# Patient Record
Sex: FEMALE | Race: WHITE | NOT HISPANIC OR LATINO | Employment: OTHER | URBAN - METROPOLITAN AREA
[De-identification: names, ages, dates, MRNs, and addresses within clinical notes are randomized per-mention and may not be internally consistent; named-entity substitution may affect disease eponyms.]

---

## 2018-06-26 ENCOUNTER — HOSPITAL ENCOUNTER (OUTPATIENT)
Dept: RADIOLOGY | Facility: HOSPITAL | Age: 43
Discharge: HOME | End: 2018-06-26
Attending: FAMILY MEDICINE
Payer: MEDICAID

## 2018-06-26 DIAGNOSIS — J44.9 COPD (CHRONIC OBSTRUCTIVE PULMONARY DISEASE): Primary | ICD-10-CM

## 2018-06-26 DIAGNOSIS — M54.2 CERVICAL PAIN: ICD-10-CM

## 2018-06-26 DIAGNOSIS — R53.81 MALAISE: ICD-10-CM

## 2018-06-26 DIAGNOSIS — F17.210 HEAVY CIGARETTE SMOKER: ICD-10-CM

## 2018-06-26 DIAGNOSIS — J44.9 COPD (CHRONIC OBSTRUCTIVE PULMONARY DISEASE): ICD-10-CM

## 2018-06-26 DIAGNOSIS — Z13.29 SCREENING FOR THYROID DISORDER: ICD-10-CM

## 2018-06-26 DIAGNOSIS — E78.9 DISORDER OF LIPOPROTEIN AND LIPID METABOLISM: ICD-10-CM

## 2018-06-26 PROCEDURE — 72110 X-RAY EXAM L-2 SPINE 4/>VWS: CPT | Mod: TC,FY

## 2018-06-26 PROCEDURE — 71046 X-RAY EXAM CHEST 2 VIEWS: CPT | Mod: TC,FY

## 2018-06-26 PROCEDURE — 71046 X-RAY EXAM CHEST 2 VIEWS: CPT | Mod: 26,,, | Performed by: RADIOLOGY

## 2018-06-26 PROCEDURE — 72050 X-RAY EXAM NECK SPINE 4/5VWS: CPT | Mod: 26,,, | Performed by: RADIOLOGY

## 2018-06-26 PROCEDURE — 72110 X-RAY EXAM L-2 SPINE 4/>VWS: CPT | Mod: 26,,, | Performed by: RADIOLOGY

## 2018-06-26 PROCEDURE — 72050 X-RAY EXAM NECK SPINE 4/5VWS: CPT | Mod: TC,FY

## 2018-08-06 ENCOUNTER — HOSPITAL ENCOUNTER (OUTPATIENT)
Dept: RADIOLOGY | Facility: HOSPITAL | Age: 43
Discharge: HOME | End: 2018-08-06
Attending: OBSTETRICS & GYNECOLOGY
Payer: MEDICAID

## 2018-08-06 DIAGNOSIS — N92.0 MENORRHAGIA: ICD-10-CM

## 2018-08-06 DIAGNOSIS — Z01.818 PREOPERATIVE TESTING: ICD-10-CM

## 2018-08-06 DIAGNOSIS — N92.0 MENORRHAGIA: Primary | ICD-10-CM

## 2018-08-06 PROCEDURE — 71046 X-RAY EXAM CHEST 2 VIEWS: CPT | Mod: TC,FY

## 2018-08-06 PROCEDURE — 71046 X-RAY EXAM CHEST 2 VIEWS: CPT | Mod: 26,,, | Performed by: RADIOLOGY

## 2018-08-08 ENCOUNTER — SURGERY (OUTPATIENT)
Age: 43
End: 2018-08-08

## 2018-08-08 ENCOUNTER — HOSPITAL ENCOUNTER (OUTPATIENT)
Facility: HOSPITAL | Age: 43
Discharge: HOME | End: 2018-08-08
Attending: OBSTETRICS & GYNECOLOGY | Admitting: OBSTETRICS & GYNECOLOGY
Payer: MEDICAID

## 2018-08-08 ENCOUNTER — ANESTHESIA (OUTPATIENT)
Dept: SURGERY | Facility: HOSPITAL | Age: 43
End: 2018-08-08
Payer: MEDICAID

## 2018-08-08 ENCOUNTER — ANESTHESIA EVENT (OUTPATIENT)
Dept: SURGERY | Facility: HOSPITAL | Age: 43
End: 2018-08-08
Payer: MEDICAID

## 2018-08-08 VITALS
RESPIRATION RATE: 19 BRPM | SYSTOLIC BLOOD PRESSURE: 115 MMHG | TEMPERATURE: 98 F | HEART RATE: 80 BPM | OXYGEN SATURATION: 100 % | BODY MASS INDEX: 38.09 KG/M2 | HEIGHT: 63 IN | DIASTOLIC BLOOD PRESSURE: 64 MMHG | WEIGHT: 215 LBS

## 2018-08-08 DIAGNOSIS — N92.1 MENOMETRORRHAGIA: ICD-10-CM

## 2018-08-08 PROBLEM — N94.6 DYSMENORRHEA: Status: ACTIVE | Noted: 2018-08-08

## 2018-08-08 PROCEDURE — 36000707: Performed by: OBSTETRICS & GYNECOLOGY

## 2018-08-08 PROCEDURE — 37000009 HC ANESTHESIA EA ADD 15 MINS: Performed by: OBSTETRICS & GYNECOLOGY

## 2018-08-08 PROCEDURE — 25000242 PHARM REV CODE 250 ALT 637 W/ HCPCS: Performed by: OBSTETRICS & GYNECOLOGY

## 2018-08-08 PROCEDURE — 25000003 PHARM REV CODE 250: Performed by: OBSTETRICS & GYNECOLOGY

## 2018-08-08 PROCEDURE — 88305 TISSUE EXAM BY PATHOLOGIST: CPT | Mod: 26,,, | Performed by: PATHOLOGY

## 2018-08-08 PROCEDURE — 37000008 HC ANESTHESIA 1ST 15 MINUTES: Performed by: OBSTETRICS & GYNECOLOGY

## 2018-08-08 PROCEDURE — D9220A PRA ANESTHESIA: Mod: ,,, | Performed by: ANESTHESIOLOGY

## 2018-08-08 PROCEDURE — 88305 TISSUE EXAM BY PATHOLOGIST: CPT | Performed by: PATHOLOGY

## 2018-08-08 PROCEDURE — 71000015 HC POSTOP RECOV 1ST HR: Performed by: OBSTETRICS & GYNECOLOGY

## 2018-08-08 PROCEDURE — 63600175 PHARM REV CODE 636 W HCPCS: Performed by: NURSE ANESTHETIST, CERTIFIED REGISTERED

## 2018-08-08 PROCEDURE — 36000706: Performed by: OBSTETRICS & GYNECOLOGY

## 2018-08-08 PROCEDURE — 27201423 OPTIME MED/SURG SUP & DEVICES STERILE SUPPLY: Performed by: OBSTETRICS & GYNECOLOGY

## 2018-08-08 RX ORDER — IPRATROPIUM BROMIDE AND ALBUTEROL SULFATE 2.5; .5 MG/3ML; MG/3ML
3 SOLUTION RESPIRATORY (INHALATION)
Status: COMPLETED | OUTPATIENT
Start: 2018-08-08 | End: 2018-08-08

## 2018-08-08 RX ORDER — SODIUM CHLORIDE, SODIUM LACTATE, POTASSIUM CHLORIDE, CALCIUM CHLORIDE 600; 310; 30; 20 MG/100ML; MG/100ML; MG/100ML; MG/100ML
INJECTION, SOLUTION INTRAVENOUS CONTINUOUS
Status: DISCONTINUED | OUTPATIENT
Start: 2018-08-08 | End: 2018-08-08

## 2018-08-08 RX ORDER — OXYCODONE AND ACETAMINOPHEN 10; 325 MG/1; MG/1
1 TABLET ORAL EVERY 4 HOURS PRN
Status: DISCONTINUED | OUTPATIENT
Start: 2018-08-08 | End: 2018-08-08 | Stop reason: HOSPADM

## 2018-08-08 RX ORDER — ONDANSETRON 2 MG/ML
4 INJECTION INTRAMUSCULAR; INTRAVENOUS DAILY PRN
Status: DISCONTINUED | OUTPATIENT
Start: 2018-08-08 | End: 2018-08-08 | Stop reason: HOSPADM

## 2018-08-08 RX ORDER — IPRATROPIUM BROMIDE 0.5 MG/2.5ML
0.5 SOLUTION RESPIRATORY (INHALATION)
Status: DISCONTINUED | OUTPATIENT
Start: 2018-08-08 | End: 2018-08-08

## 2018-08-08 RX ORDER — ONDANSETRON 4 MG/1
4 TABLET, ORALLY DISINTEGRATING ORAL ONCE
Status: DISCONTINUED | OUTPATIENT
Start: 2018-08-08 | End: 2018-08-08

## 2018-08-08 RX ORDER — IPRATROPIUM BROMIDE 0.5 MG/2.5ML
0.5 SOLUTION RESPIRATORY (INHALATION) EVERY 6 HOURS
Status: DISCONTINUED | OUTPATIENT
Start: 2018-08-08 | End: 2018-08-08

## 2018-08-08 RX ORDER — DEXAMETHASONE SODIUM PHOSPHATE 4 MG/ML
INJECTION, SOLUTION INTRA-ARTICULAR; INTRALESIONAL; INTRAMUSCULAR; INTRAVENOUS; SOFT TISSUE
Status: DISCONTINUED | OUTPATIENT
Start: 2018-08-08 | End: 2018-08-08

## 2018-08-08 RX ORDER — MIDAZOLAM HYDROCHLORIDE 1 MG/ML
2 INJECTION INTRAMUSCULAR; INTRAVENOUS ONCE
Status: DISCONTINUED | OUTPATIENT
Start: 2018-08-08 | End: 2018-08-08 | Stop reason: HOSPADM

## 2018-08-08 RX ORDER — LIDOCAINE HYDROCHLORIDE 10 MG/ML
1 INJECTION, SOLUTION EPIDURAL; INFILTRATION; INTRACAUDAL; PERINEURAL ONCE
Status: DISCONTINUED | OUTPATIENT
Start: 2018-08-08 | End: 2018-08-08 | Stop reason: HOSPADM

## 2018-08-08 RX ORDER — MIDAZOLAM HYDROCHLORIDE 1 MG/ML
INJECTION, SOLUTION INTRAMUSCULAR; INTRAVENOUS
Status: DISCONTINUED | OUTPATIENT
Start: 2018-08-08 | End: 2018-08-08

## 2018-08-08 RX ORDER — OXYCODONE AND ACETAMINOPHEN 10; 325 MG/1; MG/1
1 TABLET ORAL ONCE
Status: COMPLETED | OUTPATIENT
Start: 2018-08-08 | End: 2018-08-08

## 2018-08-08 RX ORDER — DIPHENHYDRAMINE HYDROCHLORIDE 50 MG/ML
12.5 INJECTION INTRAMUSCULAR; INTRAVENOUS
Status: DISCONTINUED | OUTPATIENT
Start: 2018-08-08 | End: 2018-08-08 | Stop reason: HOSPADM

## 2018-08-08 RX ORDER — SODIUM CHLORIDE, SODIUM LACTATE, POTASSIUM CHLORIDE, CALCIUM CHLORIDE 600; 310; 30; 20 MG/100ML; MG/100ML; MG/100ML; MG/100ML
INJECTION, SOLUTION INTRAVENOUS
Status: DISCONTINUED | OUTPATIENT
Start: 2018-08-08 | End: 2018-08-08

## 2018-08-08 RX ORDER — DOXYCYCLINE 100 MG/10ML
100 INJECTION, POWDER, LYOPHILIZED, FOR SOLUTION INTRAVENOUS ONCE
Status: COMPLETED | OUTPATIENT
Start: 2018-08-08 | End: 2018-08-08

## 2018-08-08 RX ORDER — MEPERIDINE HYDROCHLORIDE 50 MG/ML
INJECTION INTRAMUSCULAR; INTRAVENOUS; SUBCUTANEOUS
Status: DISCONTINUED | OUTPATIENT
Start: 2018-08-08 | End: 2018-08-08

## 2018-08-08 RX ORDER — MORPHINE SULFATE 4 MG/ML
2 INJECTION, SOLUTION INTRAMUSCULAR; INTRAVENOUS EVERY 5 MIN PRN
Status: DISCONTINUED | OUTPATIENT
Start: 2018-08-08 | End: 2018-08-08 | Stop reason: HOSPADM

## 2018-08-08 RX ORDER — SODIUM CHLORIDE, SODIUM LACTATE, POTASSIUM CHLORIDE, CALCIUM CHLORIDE 600; 310; 30; 20 MG/100ML; MG/100ML; MG/100ML; MG/100ML
INJECTION, SOLUTION INTRAVENOUS CONTINUOUS
Status: DISCONTINUED | OUTPATIENT
Start: 2018-08-08 | End: 2018-08-08 | Stop reason: HOSPADM

## 2018-08-08 RX ORDER — PROPOFOL 10 MG/ML
VIAL (ML) INTRAVENOUS
Status: DISCONTINUED | OUTPATIENT
Start: 2018-08-08 | End: 2018-08-08

## 2018-08-08 RX ORDER — SODIUM CHLORIDE, SODIUM LACTATE, POTASSIUM CHLORIDE, CALCIUM CHLORIDE 600; 310; 30; 20 MG/100ML; MG/100ML; MG/100ML; MG/100ML
125 INJECTION, SOLUTION INTRAVENOUS CONTINUOUS
Status: DISCONTINUED | OUTPATIENT
Start: 2018-08-08 | End: 2018-08-08 | Stop reason: HOSPADM

## 2018-08-08 RX ORDER — ONDANSETRON 2 MG/ML
INJECTION INTRAMUSCULAR; INTRAVENOUS
Status: DISCONTINUED | OUTPATIENT
Start: 2018-08-08 | End: 2018-08-08

## 2018-08-08 RX ADMIN — MEPERIDINE HYDROCHLORIDE 50 MG: 50 INJECTION INTRAMUSCULAR; INTRAVENOUS; SUBCUTANEOUS at 12:08

## 2018-08-08 RX ADMIN — PROPOFOL 200 MG: 10 INJECTION, EMULSION INTRAVENOUS at 12:08

## 2018-08-08 RX ADMIN — SODIUM CHLORIDE, POTASSIUM CHLORIDE, SODIUM LACTATE AND CALCIUM CHLORIDE: 600; 310; 30; 20 INJECTION, SOLUTION INTRAVENOUS at 11:08

## 2018-08-08 RX ADMIN — OXYCODONE HYDROCHLORIDE AND ACETAMINOPHEN 1 TABLET: 10; 325 TABLET ORAL at 12:08

## 2018-08-08 RX ADMIN — IPRATROPIUM BROMIDE AND ALBUTEROL SULFATE 3 ML: .5; 3 SOLUTION RESPIRATORY (INHALATION) at 10:08

## 2018-08-08 RX ADMIN — DOXYCYCLINE 100 MG: 100 INJECTION, POWDER, LYOPHILIZED, FOR SOLUTION INTRAVENOUS at 11:08

## 2018-08-08 RX ADMIN — MIDAZOLAM HYDROCHLORIDE 2 MG: 1 INJECTION, SOLUTION INTRAMUSCULAR; INTRAVENOUS at 11:08

## 2018-08-08 RX ADMIN — DEXAMETHASONE SODIUM PHOSPHATE 4 MG: 4 INJECTION, SOLUTION INTRAMUSCULAR; INTRAVENOUS at 12:08

## 2018-08-08 RX ADMIN — ONDANSETRON 4 MG: 2 INJECTION INTRAMUSCULAR; INTRAVENOUS at 12:08

## 2018-08-08 NOTE — H&P
[facility] - Peri Services  Obstetrics & Gynecology  History & Physical    Patient Name: Alivia Asencio  MRN: [id number]  Admission Date: 2018  Primary Care Provider: Maximino Catherine MD    Subjective:     Chief Complaint/Reason for Admission:menometrrohagia    History of Present Illness:  43 yo a1 nathan, dysmenorrhea.  Prolonged menses 2wk x 7 yrs.   emb prolif, us possible polyp, .status post ltc.        Obstetric History     No data available        Past Medical History:   Diagnosis Date    Known health problems: none      Past Surgical History:   Procedure Laterality Date    CYST REMOVAL      ovaries     TUBAL LIGATION         No prescriptions prior to admission.       Review of patient's allergies indicates:   Allergen Reactions    Pcn [penicillins]         Family History     None        Social History Main Topics    Smoking status: Current Every Day Smoker     Packs/day: 1.00     Years: 20.00    Smokeless tobacco: Never Used    Alcohol use No    Drug use: Yes     Types: Marijuana    Sexual activity: Not on file     Review of Systems   All other systems reviewed and are negative.     Objective:     Vital Signs (Most Recent):  Temp: 97.9 °F (36.6 °C) (18 1017)  Pulse: 78 (18 1057)  Resp: 18 (18 1057)  BP: 114/81 (18 1017)  SpO2: 98 % (18 1017) Vital Signs (24h Range):  Temp:  [97.9 °F (36.6 °C)] 97.9 °F (36.6 °C)  Pulse:  [78-80] 78  Resp:  [18] 18  SpO2:  [98 %] 98 %  BP: (114)/(81) 114/81     Weight: 97.5 kg (215 lb)  Body mass index is 38.09 kg/m².    Patient's last menstrual period was 2018.    Physical Exam:    HENT:   Head: Normocephalic.    Eyes: EOM are normal.    Neck: Normal range of motion.    Cardiovascular: Normal rate, regular rhythm and normal heart sounds.     Pulmonary/Chest: Breath sounds normal.        Abdominal: Soft.     Genitourinary: Vagina normal.                Skin: Skin is warm.    Psychiatric: She has a normal  mood and affect.       Laboratory:  I have personallly reviewed all pertinent lab results from the last 24 hours.    Diagnostic Results:  Labs: Reviewed  rev    Assessment/Plan:    D and c hscope and gta     No new Assessment & Plan notes have been filed under this hospital service since the last note was generated.  Service: Obstetrics and Gynecology      Agustní Iraheta MD  Obstetrics & Gynecology  [facility]

## 2018-08-08 NOTE — ANESTHESIA POSTPROCEDURE EVALUATION
"Anesthesia Post Evaluation    Patient: Alivia Asencio    Procedure(s) Performed: Procedure(s) (LRB):  HYSTEROSCOPY, WITH DILATION AND CURETTAGE OF UTERUS (N/A)  ABLATION, ENDOMETRIUM, THERMAL (N/A)    Final Anesthesia Type: general  Patient location during evaluation: PACU  Patient participation: Yes- Able to Participate  Level of consciousness: awake and awake and alert  Post-procedure vital signs: reviewed and stable  Pain management: adequate  Airway patency: patent  PONV status at discharge: No PONV  Anesthetic complications: no      Cardiovascular status: blood pressure returned to baseline  Respiratory status: unassisted and spontaneous ventilation  Hydration status: euvolemic  Follow-up not needed.        Visit Vitals  /64   Pulse 80   Temp 36.6 °C (97.8 °F)   Resp 19   Ht 5' 3" (1.6 m)   Wt 97.5 kg (215 lb)   LMP 08/06/2018   SpO2 100%   Breastfeeding? No   BMI 38.09 kg/m²       Pain/Edy Score: Pain Assessment Performed: Yes (8/8/2018 10:12 AM)  Presence of Pain: denies (8/8/2018 10:12 AM)  Pain Rating Prior to Med Admin: 4 (8/8/2018 12:45 PM)  Modified Edy Score: 20 (8/8/2018 12:51 PM)      "

## 2018-08-08 NOTE — TRANSFER OF CARE
"Anesthesia Transfer of Care Note    Patient: Alivia Asencio    Procedure(s) Performed: Procedure(s) (LRB):  HYSTEROSCOPY, WITH DILATION AND CURETTAGE OF UTERUS (N/A)  ABLATION, ENDOMETRIUM, THERMAL (N/A)    Patient location: PACU    Anesthesia Type: MAC    Transport from OR: Transported from OR on room air with adequate spontaneous ventilation    Post pain: adequate analgesia    Post assessment: no apparent anesthetic complications    Post vital signs: stable    Level of consciousness: awake, alert and oriented    Nausea/Vomiting: no nausea/vomiting    Complications: none    Transfer of care protocol was followed      Last vitals:   Visit Vitals  /81 (BP Location: Left arm, Patient Position: Lying)   Pulse 78   Temp 36.6 °C (97.9 °F) (Oral)   Resp 18   Ht 5' 3" (1.6 m)   Wt 97.5 kg (215 lb)   LMP 08/06/2018   SpO2 98%   Breastfeeding? No   BMI 38.09 kg/m²     "

## 2018-08-08 NOTE — HPI
43 yo a1 nathan, dysmenorrhea.  Prolonged menses 2wk x 7 yrs.   emb prolif, us possible polyp, .status post ltc.

## 2018-08-08 NOTE — BRIEF OP NOTE
[facility] - Periop Services  Brief Operative Note     SUMMARY     Surgery Date: 8/8/2018     Surgeon(s) and Role:     * Agustín Iraheta MD - Primary    Assisting Surgeon: None    Pre-op Diagnosis:  Menometrorrhagia [N92.1]    Post-op Diagnosis:  Post-Op Diagnosis Codes:     * Menometrorrhagia [N92.1]    Procedure(s) (LRB):  HYSTEROSCOPY, WITH DILATION AND CURETTAGE OF UTERUS (N/A)  ABLATION, ENDOMETRIUM, THERMAL (N/A)    Anesthesia: General    Description of the findings of the procedure:   Nl endo cavity.      Findings/Key Components: ut length 11 cm cx length 4 cm, cavity width 4.3 power 154 w, time 1:04  Dictation conf number [id number]    Estimated Blood Loss: * No values recorded between 8/8/2018 12:17 PM and 8/8/2018 12:28 PM *         Specimens:   Specimen (12h ago through future)    Start     Ordered    08/08/18 1224  Specimen to Pathology - Surgery  Once     Comments:  Pre-op Diagnosis: Menometrorrhagia [N92.1]Post-op Diagnosis: SAME Procedure(s):HYSTEROSCOPY, WITH DILATION AND CURETTAGE OF UTERUSABLATION, ENDOMETRIUM, THERMAL Number of specimens: 2Name of specimens: 1. ECC 2. EMC      08/08/18 1223          Discharge Note    SUMMARY     Admit Date: 8/8/2018    Discharge Date and Time:  08/08/2018 12:32 PM    Hospital Course (synopsis of major diagnoses, care, treatment, and services provided during the course of the hospital stay):      Final Diagnosis: Post-Op Diagnosis Codes:     * Menometrorrhagia [N92.1]    Disposition: Home or Self Care    Follow Up/Patient Instructions:     Medications:  Reconciled Home Medications:      Medication List      You have not been prescribed any medications.         Discharge Procedure Orders  Diet Adult Regular     Lifting restrictions   Order Comments: Lift less than 20 lbs     Other restrictions (specify):   Order Comments: No sex x 6 wks     Notify your health care provider if you experience any of the following:  temperature >100.4     Notify your  health care provider if you experience any of the following:  severe uncontrolled pain     Notify your health care provider if you experience any of the following:  redness, tenderness, or signs of infection (pain, swelling, redness, odor or green/yellow discharge around incision site)     Notify your health care provider if you experience any of the following:  difficulty breathing or increased cough     Notify your health care provider if you experience any of the following:  severe persistent headache     Notify your health care provider if you experience any of the following:   Order Comments: Increased pain     Change dressing (specify)   Order Comments: If aquasel dressing, do not remove dressing. And you may shower with it on.  It will be removed in the office at one week.       Follow-up Information     Agustín Iraheta MD In 1 week.    Specialty:  Obstetrics and Gynecology  Contact information:  [address]  [facility] [address] [phone]

## 2018-08-08 NOTE — HOSPITAL COURSE
41 yo a1 nathan, dysmenorrhea.  Prolonged menses 2wk x 7 yrs.   emb prolif, us possible polyp, .status p ost ltc    hct 36

## 2018-08-08 NOTE — SUBJECTIVE & OBJECTIVE
Obstetric History     No data available        Past Medical History:   Diagnosis Date    Known health problems: none      Past Surgical History:   Procedure Laterality Date    CYST REMOVAL      ovaries     TUBAL LIGATION         No prescriptions prior to admission.       Review of patient's allergies indicates:   Allergen Reactions    Pcn [penicillins]         Family History     None        Social History Main Topics    Smoking status: Current Every Day Smoker     Packs/day: 1.00     Years: 20.00    Smokeless tobacco: Never Used    Alcohol use No    Drug use: Yes     Types: Marijuana    Sexual activity: Not on file     Review of Systems   All other systems reviewed and are negative.     Objective:     Vital Signs (Most Recent):  Temp: 97.9 °F (36.6 °C) (08/08/18 1017)  Pulse: 78 (08/08/18 1057)  Resp: 18 (08/08/18 1057)  BP: 114/81 (08/08/18 1017)  SpO2: 98 % (08/08/18 1017) Vital Signs (24h Range):  Temp:  [97.9 °F (36.6 °C)] 97.9 °F (36.6 °C)  Pulse:  [78-80] 78  Resp:  [18] 18  SpO2:  [98 %] 98 %  BP: (114)/(81) 114/81     Weight: 97.5 kg (215 lb)  Body mass index is 38.09 kg/m².    Patient's last menstrual period was 08/06/2018.    Physical Exam:    HENT:   Head: Normocephalic.    Eyes: EOM are normal.    Neck: Normal range of motion.    Cardiovascular: Normal rate, regular rhythm and normal heart sounds.     Pulmonary/Chest: Breath sounds normal.        Abdominal: Soft.     Genitourinary: Vagina normal.                Skin: Skin is warm.    Psychiatric: She has a normal mood and affect.       Laboratory:  I have personallly reviewed all pertinent lab results from the last 24 hours.    Diagnostic Results:  Labs: Reviewed  rev

## 2018-08-08 NOTE — TRANSFER OF CARE
"Anesthesia Transfer of Care Note    Patient: Alivia Asencio    Procedure(s) Performed: Procedure(s) (LRB):  HYSTEROSCOPY, WITH DILATION AND CURETTAGE OF UTERUS (N/A)  ABLATION, ENDOMETRIUM, THERMAL (N/A)    Anesthesia PACU Handoff    Last vitals:   Visit Vitals  /81 (BP Location: Left arm, Patient Position: Lying)   Pulse 78   Temp 36.6 °C (97.9 °F) (Oral)   Resp 18   Ht 5' 3" (1.6 m)   Wt 97.5 kg (215 lb)   LMP 08/06/2018   SpO2 98%   Breastfeeding? No   BMI 38.09 kg/m²     "

## 2018-08-08 NOTE — ANESTHESIA PREPROCEDURE EVALUATION
08/08/2018  Alivia Asencio is a 43 y.o., female.    Anesthesia Evaluation    I have reviewed the Patient Summary Reports.    I have reviewed the Nursing Notes.   I have reviewed the Medications.     Review of Systems  Anesthesia Hx:  No problems with previous Anesthesia  Neg history of prior surgery. Denies Family Hx of Anesthesia complications.   Denies Personal Hx of Anesthesia complications.   Social:  Smoker    Hematology/Oncology:  Hematology Normal   Oncology Normal     EENT/Dental:EENT/Dental Normal   Cardiovascular:  Cardiovascular Normal     Pulmonary:  Pulmonary Normal    Renal/:  Renal/ Normal     Hepatic/GI:  Hepatic/GI Normal    Musculoskeletal:  Musculoskeletal Normal    Neurological:  Neurology Normal    Endocrine:  Endocrine Normal    Dermatological:  Skin Normal    Psych:  Psychiatric Normal           Physical Exam  General:  Morbid Obesity    Airway/Jaw/Neck:  AIRWAY FINDINGS: Normal      Eyes/Ears/Nose:  EYES/EARS/NOSE FINDINGS: Normal   Dental:  DENTAL FINDINGS: Normal   Chest/Lungs:  Chest/Lungs Clear    Heart/Vascular:  Heart Findings: Normal Heart murmur: negative Vascular Findings: Normal    Abdomen:  Abdomen Findings: Normal    Musculoskeletal:  Musculoskeletal Findings: Normal   Skin:  Skin Findings: Normal         Anesthesia Plan  Type of Anesthesia, risks & benefits discussed:  Anesthesia Type:  general  Patient's Preference:   Intra-op Monitoring Plan: standard ASA monitors  Intra-op Monitoring Plan Comments:   Post Op Pain Control Plan:   Post Op Pain Control Plan Comments:   Induction:   IV  Beta Blocker:  Patient is not currently on a Beta-Blocker (No further documentation required).       Informed Consent: Patient understands risks and agrees with Anesthesia plan.  Questions answered. Anesthesia consent signed with patient.  ASA Score: 2     Day of Surgery Review of  History & Physical:    H&P update referred to the provider.         Ready For Surgery From Anesthesia Perspective.

## 2018-08-08 NOTE — OP NOTE
DATE OF PROCEDURE:  08/08/2018.    SURGEON:  Agustín Iraheta MD    ANESTHESIOLOGIST:  Darian.    ANESTHESIA:  General.    PREOPERATIVE DIAGNOSES:  Menorrhagia, dysmenorrhea, possible endometrial  polyp.    POSTOPERATIVE DIAGNOSES:  Menometrorrhagia, dysmenorrhea.  No polyp seen.    PROCEDURES PERFORMED:  Dilatation and curettage, diagnostic hysteroscopy,  NovaSure global thermal ablation.    ESTIMATED BLOOD LOSS:  Less than 25 mL.    PROCEDURE IN DETAIL:  After appropriate consents were obtained, the patient  was taken to the Operating Room, given general anesthesia with endotracheal  intubation.  The patient was placed in Filiberto stirrups, prepped and draped  in usual fashion for vaginal surgery.  Bladder was emptied with a red  rubber catheter.  The patient was draped.  Examination under anesthesia  revealed uterus that was normal in size, shape, position.  Adnexa revealed  no masses.  A 10-toothed tenaculum grasped the anterior lip of cervix,  dilated to #15 Braldey dilator.  Hysteroscope was inserted using normal saline  distention medium.  The endometrial cavity and endocervical cavity were  normal.  Measurements were obtained, sounding length 11 cm, cavity length  6.5 cm.  Endocervical curettage followed by an endometrial curettage was  performed until gritty sensation was felt throughout.  NovaSure device was  then inserted and engaged.  Cavity width was 4.3 cm.  Test for leaks was  negative.  Power was 104 Jones.  Time, 1 minute 4 seconds.  The NovaSure  device was then disengaged and removed.  Hysteroscope reinserted.  Good  cauterization was noted throughout.  The patient was extubated and taken to  Recovery Room in good condition.        DY/IN dd: 08/08/2018 12:32:06 (CDT)   td: 08/08/2018 13:23:18 (CDT)  Doc ID #[id number]   Job ID #[id number]    CC:

## 2020-04-08 PROBLEM — R50.9 TEMPERATURE ELEVATION: Status: ACTIVE | Noted: 2020-04-08

## 2020-04-08 PROBLEM — N94.6 DYSMENORRHEA: Status: RESOLVED | Noted: 2018-08-08 | Resolved: 2020-04-08

## 2020-04-08 PROBLEM — N92.1 MENOMETRORRHAGIA: Status: RESOLVED | Noted: 2018-08-08 | Resolved: 2020-04-08

## 2021-05-10 ENCOUNTER — PATIENT MESSAGE (OUTPATIENT)
Dept: RESEARCH | Facility: HOSPITAL | Age: 46
End: 2021-05-10

## 2022-05-29 ENCOUNTER — HOSPITAL ENCOUNTER (EMERGENCY)
Facility: HOSPITAL | Age: 47
Discharge: HOME | End: 2022-05-29
Attending: EMERGENCY MEDICINE
Payer: COMMERCIAL

## 2022-05-29 VITALS
HEIGHT: 62 IN | SYSTOLIC BLOOD PRESSURE: 113 MMHG | BODY MASS INDEX: 42.51 KG/M2 | WEIGHT: 231 LBS | RESPIRATION RATE: 17 BRPM | OXYGEN SATURATION: 98 % | TEMPERATURE: 98 F | DIASTOLIC BLOOD PRESSURE: 65 MMHG | HEART RATE: 82 BPM

## 2022-05-29 DIAGNOSIS — R06.02 SOB (SHORTNESS OF BREATH): ICD-10-CM

## 2022-05-29 DIAGNOSIS — F41.0 PANIC ATTACK: Primary | ICD-10-CM

## 2022-05-29 LAB
ALBUMIN SERPL BCP-MCNC: 4 G/DL (ref 3.5–5.2)
ALP SERPL-CCNC: 61 U/L (ref 55–135)
ALT SERPL W/O P-5'-P-CCNC: 18 U/L (ref 10–44)
ANION GAP SERPL CALC-SCNC: 11 MMOL/L (ref 8–16)
AST SERPL-CCNC: 12 U/L (ref 10–40)
BASOPHILS # BLD AUTO: 0.03 K/UL (ref 0–0.2)
BASOPHILS NFR BLD: 0.2 % (ref 0–1.9)
BILIRUB SERPL-MCNC: 0.5 MG/DL (ref 0.1–1)
BNP SERPL-MCNC: 11 PG/ML (ref 0–99)
BUN SERPL-MCNC: 11 MG/DL (ref 6–20)
CALCIUM SERPL-MCNC: 9.3 MG/DL (ref 8.7–10.5)
CHLORIDE SERPL-SCNC: 104 MMOL/L (ref 95–110)
CO2 SERPL-SCNC: 26 MMOL/L (ref 23–29)
CREAT SERPL-MCNC: 0.8 MG/DL (ref 0.5–1.4)
DIFFERENTIAL METHOD: ABNORMAL
EOSINOPHIL # BLD AUTO: 0.1 K/UL (ref 0–0.5)
EOSINOPHIL NFR BLD: 0.8 % (ref 0–8)
ERYTHROCYTE [DISTWIDTH] IN BLOOD BY AUTOMATED COUNT: 12.7 % (ref 11.5–14.5)
EST. GFR  (AFRICAN AMERICAN): >60 ML/MIN/1.73 M^2
EST. GFR  (NON AFRICAN AMERICAN): >60 ML/MIN/1.73 M^2
GLUCOSE SERPL-MCNC: 88 MG/DL (ref 70–110)
HCT VFR BLD AUTO: 47.2 % (ref 37–48.5)
HGB BLD-MCNC: 16.3 G/DL (ref 12–16)
IMM GRANULOCYTES # BLD AUTO: 0.12 K/UL (ref 0–0.04)
IMM GRANULOCYTES NFR BLD AUTO: 0.9 % (ref 0–0.5)
LYMPHOCYTES # BLD AUTO: 5.5 K/UL (ref 1–4.8)
LYMPHOCYTES NFR BLD: 43 % (ref 18–48)
MCH RBC QN AUTO: 34.3 PG (ref 27–31)
MCHC RBC AUTO-ENTMCNC: 34.5 G/DL (ref 32–36)
MCV RBC AUTO: 99 FL (ref 82–98)
MONOCYTES # BLD AUTO: 1.3 K/UL (ref 0.3–1)
MONOCYTES NFR BLD: 10.1 % (ref 4–15)
NEUTROPHILS # BLD AUTO: 5.7 K/UL (ref 1.8–7.7)
NEUTROPHILS NFR BLD: 45 % (ref 38–73)
NRBC BLD-RTO: 0 /100 WBC
PLATELET # BLD AUTO: 347 K/UL (ref 150–450)
PMV BLD AUTO: 11.1 FL (ref 9.2–12.9)
POTASSIUM SERPL-SCNC: 3.6 MMOL/L (ref 3.5–5.1)
PROT SERPL-MCNC: 7 G/DL (ref 6–8.4)
RBC # BLD AUTO: 4.75 M/UL (ref 4–5.4)
SODIUM SERPL-SCNC: 141 MMOL/L (ref 136–145)
TROPONIN I SERPL DL<=0.01 NG/ML-MCNC: <0.006 NG/ML (ref 0–0.03)
WBC # BLD AUTO: 12.77 K/UL (ref 3.9–12.7)

## 2022-05-29 PROCEDURE — 93010 ELECTROCARDIOGRAM REPORT: CPT | Mod: ,,, | Performed by: SPECIALIST

## 2022-05-29 PROCEDURE — 99285 EMERGENCY DEPT VISIT HI MDM: CPT | Mod: 25

## 2022-05-29 PROCEDURE — 93010 EKG 12-LEAD: ICD-10-PCS | Mod: ,,, | Performed by: SPECIALIST

## 2022-05-29 PROCEDURE — 36415 COLL VENOUS BLD VENIPUNCTURE: CPT | Performed by: EMERGENCY MEDICINE

## 2022-05-29 PROCEDURE — 85025 COMPLETE CBC W/AUTO DIFF WBC: CPT | Performed by: EMERGENCY MEDICINE

## 2022-05-29 PROCEDURE — 93005 ELECTROCARDIOGRAM TRACING: CPT

## 2022-05-29 PROCEDURE — 84484 ASSAY OF TROPONIN QUANT: CPT | Performed by: EMERGENCY MEDICINE

## 2022-05-29 PROCEDURE — 80053 COMPREHEN METABOLIC PANEL: CPT | Performed by: EMERGENCY MEDICINE

## 2022-05-29 PROCEDURE — 96374 THER/PROPH/DIAG INJ IV PUSH: CPT

## 2022-05-29 PROCEDURE — 83880 ASSAY OF NATRIURETIC PEPTIDE: CPT | Performed by: EMERGENCY MEDICINE

## 2022-05-29 PROCEDURE — 63600175 PHARM REV CODE 636 W HCPCS: Performed by: EMERGENCY MEDICINE

## 2022-05-29 RX ORDER — LORAZEPAM 2 MG/ML
1 INJECTION INTRAMUSCULAR
Status: COMPLETED | OUTPATIENT
Start: 2022-05-29 | End: 2022-05-29

## 2022-05-29 RX ORDER — DIAZEPAM 5 MG/1
5 TABLET ORAL EVERY 6 HOURS PRN
Qty: 10 TABLET | Refills: 0 | Status: SHIPPED | OUTPATIENT
Start: 2022-05-29 | End: 2023-09-14

## 2022-05-29 RX ADMIN — LORAZEPAM 1 MG: 2 INJECTION INTRAMUSCULAR; INTRAVENOUS at 09:05

## 2022-05-29 NOTE — ED PROVIDER NOTES
"Encounter Date: 5/29/2022    SCRIBE #1 NOTE: I, Deloris Keen, am scribing for, and in the presence of, Josué Nieves MD.       History     Chief Complaint   Patient presents with    Shortness of Breath    Hypertension     Started yesterday / COVID positive 2 weeks ago  / just completed steroids     Vaginal Bleeding     Time seen by provider: 8:37 AM on 05/29/2022    Alivia Asencio is a 46 y.o. female who presents to the ED with her  for an onset of SOB and elevated BP near 180 mmHg systolic since this morning.  Patient expresses that she was recently diagnosed with COVID-19 (2 weeks ago) and was started on steroids, which she finished yesterday.  She states her cat just had kittens in her home and notes she is allergic which caused her to become upset because her  has not "given them away or taken them out yet."  Patient presents to the ED c/o numbness to her head and body, paresthesias of the feet, SOB, mild CP, a chronic cough and notes she feels as if she is in a "tunnel."  She also notes vaginal bleeding for 3 months.  The patient denies fever, calf pain or swelling, abdominal pain or any other symptoms at this time.  She is a 1 ppd smoker.  No known PMHx and no pertinent PSHx documented.      The history is provided by the patient and the spouse.     Review of patient's allergies indicates:   Allergen Reactions    Nsaids (non-steroidal anti-inflammatory drug) Other (See Comments)    Pcn [penicillins]      Past Medical History:   Diagnosis Date    Known health problems: none      Past Surgical History:   Procedure Laterality Date    CYST REMOVAL      ovaries     HYSTEROSCOPY WITH DILATION AND CURETTAGE OF UTERUS N/A 8/8/2018    Procedure: HYSTEROSCOPY, WITH DILATION AND CURETTAGE OF UTERUS;  Surgeon: Agsutín Iraheta MD;  [facility] OR;  Service: OB/GYN;  Laterality: N/A;    THERMAL ABLATION OF ENDOMETRIUM N/A 8/8/2018    Procedure: ABLATION, ENDOMETRIUM, THERMAL;  Surgeon: " Agustín Iraheta MD;  Location: [facility] OR;  Service: OB/GYN;  Laterality: N/A;    TUBAL LIGATION       Family History   Problem Relation Age of Onset    Colon cancer Father      Social History     Tobacco Use    Smoking status: Current Every Day Smoker     Packs/day: 1.00     Years: 20.00     Pack years: 20.00    Smokeless tobacco: Never Used   Substance Use Topics    Alcohol use: Yes    Drug use: Yes     Types: Marijuana     Review of Systems   Constitutional: Negative for fever.   HENT: Negative for sore throat.    Respiratory: Positive for cough (chronic) and shortness of breath.    Cardiovascular: Positive for chest pain. Negative for leg swelling.   Gastrointestinal: Negative for abdominal pain and nausea.   Genitourinary: Positive for vaginal bleeding. Negative for dysuria.   Musculoskeletal: Negative for back pain and myalgias.   Skin: Negative for rash.   Neurological: Positive for numbness. Negative for weakness.        Positive for paresthesias of the feet.    Hematological: Does not bruise/bleed easily.       Physical Exam     Initial Vitals [05/29/22 0808]   BP Pulse Resp Temp SpO2   (!) 141/89 107 20 98.4 °F (36.9 °C) 100 %      MAP       --         Physical Exam    Nursing note and vitals reviewed.  Constitutional: Vital signs are normal. She appears well-developed and well-nourished.  Non-toxic appearance. No distress.   HENT:   Head: Normocephalic and atraumatic.   Eyes: EOM are normal. Pupils are equal, round, and reactive to light.   Neck: Neck supple. No JVD present.   Normal range of motion.  Cardiovascular: Normal rate, regular rhythm, normal heart sounds and intact distal pulses. Exam reveals no gallop and no friction rub.    No murmur heard.  Pulmonary/Chest: Breath sounds normal. She has no wheezes. She has no rhonchi. She has no rales.   Abdominal: Abdomen is soft. Bowel sounds are normal. There is no abdominal tenderness. There is no rebound and no guarding.   Musculoskeletal:          General: Normal range of motion.      Cervical back: Normal range of motion and neck supple. No rigidity.     Neurological: She is alert and oriented to person, place, and time. She has normal strength and normal reflexes. No cranial nerve deficit or sensory deficit. She exhibits normal muscle tone. Coordination normal. GCS eye subscore is 4. GCS verbal subscore is 5. GCS motor subscore is 6.   Skin: Skin is warm and dry.   Psychiatric: Her speech is normal and behavior is normal. Her mood appears anxious (extremely). She is not actively hallucinating.         ED Course   Procedures  Labs Reviewed   CBC W/ AUTO DIFFERENTIAL - Abnormal; Notable for the following components:       Result Value    WBC 12.77 (*)     Hemoglobin 16.3 (*)     MCV 99 (*)     MCH 34.3 (*)     Immature Granulocytes 0.9 (*)     Immature Grans (Abs) 0.12 (*)     Lymph # 5.5 (*)     Mono # 1.3 (*)     All other components within normal limits   COMPREHENSIVE METABOLIC PANEL   TROPONIN I   B-TYPE NATRIURETIC PEPTIDE     EKG Readings: (Independently Interpreted)   Initial Reading: No STEMI.   Normal sinus rhythm at a rate of 84 bpm with normal axis, normal T waves, normal ST segments and low voltage QRS.         Imaging Results          X-Ray Chest AP Portable (Final result)  Result time 05/29/22 08:51:00    Final result by Yanni Negron MD (05/29/22 08:51:00)                 Impression:      No acute abnormality.      Electronically signed by: Yanni Negron  Date:    05/29/2022  Time:    08:51             Narrative:    EXAMINATION:  XR CHEST AP PORTABLE    CLINICAL HISTORY:  Chest Pain;    TECHNIQUE:  Single frontal view of the chest was performed.    COMPARISON:  08/06/2018, 03/13/2019    FINDINGS:  The lungs are clear, with normal appearance of pulmonary vasculature and no pleural effusion or pneumothorax.    The cardiac silhouette is normal in size. The hilar and mediastinal contours are unremarkable.    Bones are intact.                                  Medications   lorazepam injection 1 mg (1 mg Intravenous Given 5/29/22 0922)     Medical Decision Making:   History:   Old Medical Records: I decided to obtain old medical records.  Initial Assessment:   46-year-old woman who presents emergency department with onset of shortness of breath, bilateral hand and leg tingling, chest pain, elevated blood pressure after arguing with her  about cats in their house.  History of anxiety for which she is to take Valium however has moved to a new area and does not currently have primary care provider.  Examination she appears very anxious.  She states she feels as though she is in a tunnel.  Cardiac workup performed showing no evidence of ischemia or malignant arrhythmias.  Chest x-ray unremarkable.  She is not significantly anemic, dehydrated or having electrolyte abnormalities.  Her symptoms are consistent with a panic attack.  She is given Ativan emergency department with resolution of symptoms.  She is appropriate for outpatient follow-up with primary care physician/mental health.  I will give her short course of Valium.  Return precautions discussed.  Discharged in no acute distress.  Independently Interpreted Test(s):   I have ordered and independently interpreted X-rays - see prior notes.  I have ordered and independently interpreted EKG Reading(s) - see prior notes  Clinical Tests:   Lab Tests: Ordered and Reviewed  Radiological Study: Ordered and Reviewed  Medical Tests: Ordered and Reviewed          Scribe Attestation:   Scribe #1: I performed the above scribed service and the documentation accurately describes the services I performed. I attest to the accuracy of the note.               I, Ezequiel Sawant, personally performed the services described in this documentation. All medical record entries made by the scribe were at my direction and in my presence.  I have reviewed the chart and agree that the record reflects my personal  performance and is accurate and complete. Josué Nieves MD.    Clinical Impression:   Final diagnoses:  [R06.02] SOB (shortness of breath)  [F41.0] Panic attack (Primary)          ED Disposition Condition    Discharge Stable        ED Prescriptions     Medication Sig Dispense Start Date End Date Auth. Provider    diazePAM (VALIUM) 5 MG tablet Take 1 tablet (5 mg total) by mouth every 6 (six) hours as needed for Anxiety. 10 tablet 5/29/2022 6/28/2022 Josué Nieves MD        Follow-up Information     Follow up With Specialties Details Why Contact Info    [facility]  Schedule an appointment as soon as possible for a visit   [address] [id number] [phone]      [address]  [phone]    [facility] - Emergency Dept Emergency Medicine  As needed, If symptoms worsen [address] [place] [id number] [phone]           Josué Nieves MD  05/29/22 [id number]

## 2023-09-14 ENCOUNTER — OFFICE VISIT (OUTPATIENT)
Dept: FAMILY MEDICINE | Facility: CLINIC | Age: 48
End: 2023-09-14
Payer: COMMERCIAL

## 2023-09-14 ENCOUNTER — TELEPHONE (OUTPATIENT)
Dept: FAMILY MEDICINE | Facility: CLINIC | Age: 48
End: 2023-09-14

## 2023-09-14 VITALS
DIASTOLIC BLOOD PRESSURE: 90 MMHG | SYSTOLIC BLOOD PRESSURE: 158 MMHG | OXYGEN SATURATION: 97 % | BODY MASS INDEX: 42.88 KG/M2 | HEART RATE: 86 BPM | HEIGHT: 62 IN | TEMPERATURE: 98 F | WEIGHT: 233 LBS

## 2023-09-14 DIAGNOSIS — Z11.59 NEED FOR HEPATITIS C SCREENING TEST: ICD-10-CM

## 2023-09-14 DIAGNOSIS — Z12.31 VISIT FOR SCREENING MAMMOGRAM: ICD-10-CM

## 2023-09-14 DIAGNOSIS — I10 PRIMARY HYPERTENSION: Primary | ICD-10-CM

## 2023-09-14 DIAGNOSIS — Z12.11 COLON CANCER SCREENING: Primary | ICD-10-CM

## 2023-09-14 DIAGNOSIS — E66.01 CLASS 3 SEVERE OBESITY WITH SERIOUS COMORBIDITY AND BODY MASS INDEX (BMI) OF 40.0 TO 44.9 IN ADULT, UNSPECIFIED OBESITY TYPE: ICD-10-CM

## 2023-09-14 DIAGNOSIS — Z11.4 ENCOUNTER FOR SCREENING FOR HIV: ICD-10-CM

## 2023-09-14 DIAGNOSIS — Z12.11 COLON CANCER SCREENING: ICD-10-CM

## 2023-09-14 DIAGNOSIS — M54.16 LUMBAR RADICULOPATHY: ICD-10-CM

## 2023-09-14 PROBLEM — M54.9 BACK PAIN: Status: ACTIVE | Noted: 2023-09-14

## 2023-09-14 PROBLEM — M54.2 NECK PAIN: Status: ACTIVE | Noted: 2023-09-14

## 2023-09-14 PROCEDURE — 99203 OFFICE O/P NEW LOW 30 MIN: CPT | Mod: S$GLB,,, | Performed by: INTERNAL MEDICINE

## 2023-09-14 PROCEDURE — 99203 PR OFFICE/OUTPT VISIT, NEW, LEVL III, 30-44 MIN: ICD-10-PCS | Mod: S$GLB,,, | Performed by: INTERNAL MEDICINE

## 2023-09-14 PROCEDURE — 1159F PR MEDICATION LIST DOCUMENTED IN MEDICAL RECORD: ICD-10-PCS | Mod: CPTII,S$GLB,, | Performed by: INTERNAL MEDICINE

## 2023-09-14 PROCEDURE — 1159F MED LIST DOCD IN RCRD: CPT | Mod: CPTII,S$GLB,, | Performed by: INTERNAL MEDICINE

## 2023-09-14 PROCEDURE — 3077F SYST BP >= 140 MM HG: CPT | Mod: CPTII,S$GLB,, | Performed by: INTERNAL MEDICINE

## 2023-09-14 PROCEDURE — 3077F PR MOST RECENT SYSTOLIC BLOOD PRESSURE >= 140 MM HG: ICD-10-PCS | Mod: CPTII,S$GLB,, | Performed by: INTERNAL MEDICINE

## 2023-09-14 PROCEDURE — 3008F PR BODY MASS INDEX (BMI) DOCUMENTED: ICD-10-PCS | Mod: CPTII,S$GLB,, | Performed by: INTERNAL MEDICINE

## 2023-09-14 PROCEDURE — 3080F DIAST BP >= 90 MM HG: CPT | Mod: CPTII,S$GLB,, | Performed by: INTERNAL MEDICINE

## 2023-09-14 PROCEDURE — 3008F BODY MASS INDEX DOCD: CPT | Mod: CPTII,S$GLB,, | Performed by: INTERNAL MEDICINE

## 2023-09-14 PROCEDURE — 3080F PR MOST RECENT DIASTOLIC BLOOD PRESSURE >= 90 MM HG: ICD-10-PCS | Mod: CPTII,S$GLB,, | Performed by: INTERNAL MEDICINE

## 2023-09-14 RX ORDER — LISINOPRIL 20 MG/1
20 TABLET ORAL DAILY
Qty: 90 TABLET | Refills: 1 | Status: SHIPPED | OUTPATIENT
Start: 2023-09-14 | End: 2024-01-03

## 2023-09-14 RX ORDER — HYDROCODONE BITARTRATE AND ACETAMINOPHEN 10; 325 MG/1; MG/1
1 TABLET ORAL EVERY 8 HOURS PRN
COMMUNITY
Start: 2023-09-08

## 2023-09-14 RX ORDER — GABAPENTIN 300 MG/1
300 CAPSULE ORAL 2 TIMES DAILY
Qty: 60 CAPSULE | Refills: 3 | Status: SHIPPED | OUTPATIENT
Start: 2023-09-14 | End: 2023-10-26 | Stop reason: SDUPTHER

## 2023-09-14 NOTE — TELEPHONE ENCOUNTER
----- Message from Renae Plascencia MA sent at 9/14/2023 11:26 AM CDT -----  Regarding: FW: resend referral    ----- Message -----  From: Libertad Lieberman  Sent: 9/14/2023  11:07 AM CDT  To: [facility] Staff  Subject: resend referral                                  Patient need referral to gastro resent with provider on referral

## 2023-09-14 NOTE — PROGRESS NOTES
Subjective:       Patient ID: Alivia Asencio is a 48 y.o. female.    Chief Complaint: Establish Care (New patient establishment) and Foot Swelling (Feet swelling and red /Right foot bleeding 2 months ago/)    Here to establish care; hasn't really had a PCP in years.  She has been seeing specialist for back and neck issues.  She has neck surgery about a year ago and was planning lower back surgery but her insurance changed and the surgeon she was seeing doesn't accept her current insurance.   She reports chronic pain with radiation down legs and numbness/tingling in feet.  Her activity is severely limited by the pain in her feet.  She has never been treated for HTN but has been denied as a plasma donor d/t elevated blood pressure.  She reports an episode 2 months ago when her legs both swelled, became erythematous, and developed blisters that bled.  Occurred after swimming at the beach.        Review of Systems   Constitutional:  Positive for chills. Negative for activity change, appetite change, diaphoresis, fatigue, fever and unexpected weight change.   HENT:  Positive for congestion, hearing loss, postnasal drip, sore throat, trouble swallowing and voice change. Negative for ear discharge, ear pain, mouth sores, nosebleeds, rhinorrhea, sinus pressure, sinus pain, sneezing and tinnitus.    Eyes:  Negative for photophobia, pain, discharge, redness, itching and visual disturbance.   Respiratory:  Positive for wheezing. Negative for apnea, cough, choking, chest tightness and shortness of breath.    Cardiovascular:  Positive for leg swelling. Negative for chest pain and palpitations.   Gastrointestinal:  Negative for abdominal distention, abdominal pain, blood in stool, constipation, diarrhea, nausea and vomiting.   Endocrine: Negative for cold intolerance, heat intolerance, polydipsia and polyuria.   Genitourinary:  Negative for decreased urine volume, difficulty urinating, dyspareunia, dysuria, enuresis,  frequency, genital sores, hematuria, menstrual problem, pelvic pain, urgency, vaginal bleeding, vaginal discharge and vaginal pain.   Musculoskeletal:  Positive for back pain, joint swelling and neck pain. Negative for arthralgias, gait problem, myalgias and neck stiffness.   Skin:  Negative for rash and wound.   Allergic/Immunologic: Negative for environmental allergies, food allergies and immunocompromised state.   Neurological:  Positive for light-headedness and headaches. Negative for dizziness, tremors, seizures, syncope, facial asymmetry, speech difficulty, weakness and numbness.   Hematological:  Negative for adenopathy. Does not bruise/bleed easily.   Psychiatric/Behavioral:  Positive for sleep disturbance. Negative for confusion, decreased concentration, hallucinations, self-injury and suicidal ideas. The patient is not nervous/anxious.        Past Medical History:   Diagnosis Date    DDD (degenerative disc disease), cervical     DDD (degenerative disc disease), lumbar     Known health problems: none       Past Surgical History:   Procedure Laterality Date    CYST REMOVAL      ovaries     HYSTEROSCOPY WITH DILATION AND CURETTAGE OF UTERUS N/A 08/08/2018    Procedure: HYSTEROSCOPY, WITH DILATION AND CURETTAGE OF UTERUS;  Surgeon: Agustín Iraheta MD;  Location: [facility] OR;  Service: OB/GYN;  Laterality: N/A;    NECK SURGERY  09/11/2022    THERMAL ABLATION OF ENDOMETRIUM N/A 08/08/2018    Procedure: ABLATION, ENDOMETRIUM, THERMAL;  Surgeon: Agustín Iraheta MD;  Location: [facility] OR;  Service: OB/GYN;  Laterality: N/A;    TUBAL LIGATION         Family History   Problem Relation Age of Onset    COPD Mother     Colon cancer Father 70    Diabetes Maternal Grandmother     Diabetes Paternal Grandmother        Social History     Socioeconomic History    Marital status:     Number of children: 2   Occupational History    Occupation: disabled   Tobacco Use    Smoking status: Former     Current packs/day: 0.00      "Average packs/day: 1 pack/day for 20.0 years (20.0 ttl pk-yrs)     Types: Cigarettes     Quit date: 2023     Years since quittin.4    Smokeless tobacco: Never   Substance and Sexual Activity    Alcohol use: Not Currently    Drug use: Not Currently     Types: Marijuana    Sexual activity: Yes     Partners: Male     Birth control/protection: None, Surgical, See Surgical Hx   Social History Narrative    Live with        Current Outpatient Medications   Medication Sig Dispense Refill    HYDROcodone-acetaminophen (NORCO)  mg per tablet Take 1 tablet by mouth every 8 (eight) hours as needed.      gabapentin (NEURONTIN) 300 MG capsule Take 1 capsule (300 mg total) by mouth 2 (two) times daily. 60 capsule 3    lisinopriL (PRINIVIL,ZESTRIL) 20 MG tablet Take 1 tablet (20 mg total) by mouth once daily. 90 tablet 1     No current facility-administered medications for this visit.       Review of patient's allergies indicates:   Allergen Reactions    Nsaids (non-steroidal anti-inflammatory drug) Other (See Comments)    Pcn [penicillins]      Objective:    HPI     Establish Care     Additional comments: New patient establishment           Foot Swelling     Additional comments: Feet swelling and red   Right foot bleeding 2 months ago            Last edited by Omkar Farias Jr., MD on 2023 10:19 AM.      Blood pressure (!) 158/90, pulse 86, temperature 98.1 °F (36.7 °C), temperature source Temporal, height 5' 2" (1.575 m), weight 105.7 kg (233 lb), SpO2 97 %. Body mass index is 42.62 kg/m².   Physical Exam  Vitals and nursing note reviewed.   Constitutional:       General: She is not in acute distress.     Appearance: Normal appearance. She is well-developed. She is not ill-appearing, toxic-appearing or diaphoretic.   HENT:      Head: Normocephalic and atraumatic.      Right Ear: Hearing, tympanic membrane, ear canal and external ear normal.      Left Ear: Hearing, tympanic membrane, ear canal and " external ear normal.      Nose: Nose normal.      Mouth/Throat:      Pharynx: Uvula midline.   Eyes:      General: Lids are normal. No scleral icterus.        Right eye: No discharge.         Left eye: No discharge.      Conjunctiva/sclera: Conjunctivae normal.      Right eye: Right conjunctiva is not injected. No hemorrhage.     Left eye: Left conjunctiva is not injected. No hemorrhage.     Pupils: Pupils are equal, round, and reactive to light.   Neck:      Thyroid: No thyromegaly.      Vascular: No carotid bruit.   Cardiovascular:      Rate and Rhythm: Normal rate and regular rhythm.      Pulses:           Dorsalis pedis pulses are 2+ on the right side and 2+ on the left side.      Heart sounds: Normal heart sounds. No murmur heard.     No friction rub. No gallop.   Pulmonary:      Effort: Pulmonary effort is normal. No respiratory distress.      Breath sounds: Normal breath sounds. No wheezing, rhonchi or rales.   Abdominal:      General: Bowel sounds are normal. There is no distension.      Palpations: Abdomen is soft. There is no mass.      Tenderness: There is no abdominal tenderness. There is no guarding or rebound.      Hernia: No hernia is present.   Feet:      Right foot:      Protective Sensation: 10 sites tested.  10 sites sensed.      Skin integrity: No ulcer, blister, skin breakdown, erythema, warmth, callus or dry skin.      Left foot:      Protective Sensation: 10 sites tested.  10 sites sensed.      Skin integrity: No ulcer, blister, skin breakdown, erythema, warmth, callus or dry skin.   Lymphadenopathy:      Cervical: No cervical adenopathy.   Skin:     General: Skin is warm and dry.   Neurological:      Mental Status: She is alert.      Cranial Nerves: No cranial nerve deficit.      Motor: No tremor.      Deep Tendon Reflexes: Reflexes are normal and symmetric.   Psychiatric:         Speech: Speech normal.         Behavior: Behavior normal.             Assessment:       1. Primary hypertension     2. Class 3 severe obesity with serious comorbidity and body mass index (BMI) of 40.0 to 44.9 in adult, unspecified obesity type    3. Lumbar radiculopathy    4. Visit for screening mammogram    5. Colon cancer screening    6. Need for hepatitis C screening test    7. Encounter for screening for HIV        Plan:       Alivia was seen today for establish care and foot swelling.    Diagnoses and all orders for this visit:    Primary hypertension  -     CBC Auto Differential; Future  -     Comprehensive Metabolic Panel; Future  -     Lipid Panel; Future  -     Urinalysis; Future  -     lisinopriL (PRINIVIL,ZESTRIL) 20 MG tablet; Take 1 tablet (20 mg total) by mouth once daily.    Class 3 severe obesity with serious comorbidity and body mass index (BMI) of 40.0 to 44.9 in adult, unspecified obesity type  -     TSH; Future    Lumbar radiculopathy  -     Ambulatory referral/consult to Neurosurgery; Future  -     gabapentin (NEURONTIN) 300 MG capsule; Take 1 capsule (300 mg total) by mouth 2 (two) times daily.    Visit for screening mammogram  -     Mammo Digital Screening Bilat w/ Omar; Future    Colon cancer screening  -     Ambulatory referral/consult to Gastroenterology; Future    Need for hepatitis C screening test  -     Hepatitis C Antibody; Future    Encounter for screening for HIV  -     HIV 1/2 Ag/Ab (4th Gen); Future

## 2023-09-19 ENCOUNTER — HOSPITAL ENCOUNTER (OUTPATIENT)
Dept: RADIOLOGY | Facility: HOSPITAL | Age: 48
Discharge: HOME | End: 2023-09-19
Attending: INTERNAL MEDICINE
Payer: COMMERCIAL

## 2023-09-19 DIAGNOSIS — Z12.31 VISIT FOR SCREENING MAMMOGRAM: ICD-10-CM

## 2023-09-19 DIAGNOSIS — R92.8 ABNORMAL MAMMOGRAM OF RIGHT BREAST: Primary | ICD-10-CM

## 2023-09-19 DIAGNOSIS — N94.19 DYSPAREUNIA DUE TO MEDICAL CONDITION IN FEMALE: Primary | ICD-10-CM

## 2023-09-19 PROCEDURE — 77067 SCR MAMMO BI INCL CAD: CPT | Mod: TC,PO

## 2023-09-19 NOTE — PROGRESS NOTES
Spoke with patient and she was notified about needing a Rt breast U/S.  She also is requesting a new referral to a GYN her in [place].

## 2023-09-20 ENCOUNTER — TELEPHONE (OUTPATIENT)
Dept: FAMILY MEDICINE | Facility: CLINIC | Age: 48
End: 2023-09-20

## 2023-09-20 NOTE — TELEPHONE ENCOUNTER
"----- Message from Omkar Farias Jr., MD sent at 9/20/2023  8:35 AM CDT -----  Your recent lab work showed an elevation in your cholesterol and/or triglycerides.   This may increase your risk for heart disease in the future. Initial treatment will consist of a reduced fat diet and exercise. Try and make most of your fat intake from monounsaturated fats and limit saturated fats. Keep in mind that just because a label says "no cholesterol" doesn't mean it's healthy. It could still be high in saturated fat so read those labels!  I would like to recheck your labs in 6-12 months. Should you have any further questions, we can discuss it at your next regular appointment or you can make an appointment sooner to review these labs if you prefer    Other labs fine  "

## 2023-10-09 ENCOUNTER — HOSPITAL ENCOUNTER (OUTPATIENT)
Dept: RADIOLOGY | Facility: HOSPITAL | Age: 48
Discharge: HOME | End: 2023-10-09
Attending: INTERNAL MEDICINE
Payer: COMMERCIAL

## 2023-10-09 DIAGNOSIS — R92.8 ABNORMAL MAMMOGRAM OF RIGHT BREAST: ICD-10-CM

## 2023-10-09 DIAGNOSIS — N60.01 BREAST CYST, RIGHT: Primary | ICD-10-CM

## 2023-10-09 PROCEDURE — 76642 ULTRASOUND BREAST LIMITED: CPT | Mod: TC,PO,RT

## 2023-10-26 ENCOUNTER — OFFICE VISIT (OUTPATIENT)
Dept: FAMILY MEDICINE | Facility: CLINIC | Age: 48
End: 2023-10-26
Payer: COMMERCIAL

## 2023-10-26 VITALS
HEART RATE: 86 BPM | BODY MASS INDEX: 44.35 KG/M2 | TEMPERATURE: 100 F | WEIGHT: 241 LBS | SYSTOLIC BLOOD PRESSURE: 108 MMHG | DIASTOLIC BLOOD PRESSURE: 80 MMHG | HEIGHT: 62 IN | OXYGEN SATURATION: 97 %

## 2023-10-26 DIAGNOSIS — M54.16 LUMBAR RADICULOPATHY: ICD-10-CM

## 2023-10-26 DIAGNOSIS — I10 PRIMARY HYPERTENSION: Primary | ICD-10-CM

## 2023-10-26 DIAGNOSIS — E78.2 MIXED HYPERLIPIDEMIA: ICD-10-CM

## 2023-10-26 DIAGNOSIS — F41.9 ANXIETY: ICD-10-CM

## 2023-10-26 PROBLEM — M54.12 CERVICAL RADICULOPATHY: Status: ACTIVE | Noted: 2021-09-15

## 2023-10-26 PROBLEM — Z79.891 LONG-TERM CURRENT USE OF OPIATE ANALGESIC: Status: ACTIVE | Noted: 2021-11-03

## 2023-10-26 PROBLEM — G89.4 CHRONIC PAIN SYNDROME: Status: ACTIVE | Noted: 2021-09-15

## 2023-10-26 PROBLEM — M47.812 CERVICAL SPONDYLOSIS WITHOUT MYELOPATHY: Status: ACTIVE | Noted: 2022-07-06

## 2023-10-26 PROBLEM — M47.819 SPONDYLOSIS WITHOUT MYELOPATHY: Status: ACTIVE | Noted: 2021-09-15

## 2023-10-26 PROBLEM — M54.17 LUMBOSACRAL RADICULOPATHY: Status: ACTIVE | Noted: 2021-09-15

## 2023-10-26 PROCEDURE — 3074F SYST BP LT 130 MM HG: CPT | Mod: CPTII,S$GLB,, | Performed by: INTERNAL MEDICINE

## 2023-10-26 PROCEDURE — 3008F PR BODY MASS INDEX (BMI) DOCUMENTED: ICD-10-PCS | Mod: CPTII,S$GLB,, | Performed by: INTERNAL MEDICINE

## 2023-10-26 PROCEDURE — 3008F BODY MASS INDEX DOCD: CPT | Mod: CPTII,S$GLB,, | Performed by: INTERNAL MEDICINE

## 2023-10-26 PROCEDURE — 99214 OFFICE O/P EST MOD 30 MIN: CPT | Mod: S$GLB,,, | Performed by: INTERNAL MEDICINE

## 2023-10-26 PROCEDURE — 3079F DIAST BP 80-89 MM HG: CPT | Mod: CPTII,S$GLB,, | Performed by: INTERNAL MEDICINE

## 2023-10-26 PROCEDURE — 99214 PR OFFICE/OUTPT VISIT, EST, LEVL IV, 30-39 MIN: ICD-10-PCS | Mod: S$GLB,,, | Performed by: INTERNAL MEDICINE

## 2023-10-26 PROCEDURE — 3079F PR MOST RECENT DIASTOLIC BLOOD PRESSURE 80-89 MM HG: ICD-10-PCS | Mod: CPTII,S$GLB,, | Performed by: INTERNAL MEDICINE

## 2023-10-26 PROCEDURE — 4010F PR ACE/ARB THEARPY RXD/TAKEN: ICD-10-PCS | Mod: CPTII,S$GLB,, | Performed by: INTERNAL MEDICINE

## 2023-10-26 PROCEDURE — 1159F MED LIST DOCD IN RCRD: CPT | Mod: CPTII,S$GLB,, | Performed by: INTERNAL MEDICINE

## 2023-10-26 PROCEDURE — 4010F ACE/ARB THERAPY RXD/TAKEN: CPT | Mod: CPTII,S$GLB,, | Performed by: INTERNAL MEDICINE

## 2023-10-26 PROCEDURE — 1159F PR MEDICATION LIST DOCUMENTED IN MEDICAL RECORD: ICD-10-PCS | Mod: CPTII,S$GLB,, | Performed by: INTERNAL MEDICINE

## 2023-10-26 PROCEDURE — 3074F PR MOST RECENT SYSTOLIC BLOOD PRESSURE < 130 MM HG: ICD-10-PCS | Mod: CPTII,S$GLB,, | Performed by: INTERNAL MEDICINE

## 2023-10-26 RX ORDER — GABAPENTIN 300 MG/1
300 CAPSULE ORAL 3 TIMES DAILY
Qty: 270 CAPSULE | Refills: 1 | Status: SHIPPED | OUTPATIENT
Start: 2023-10-26 | End: 2024-01-26 | Stop reason: SINTOL

## 2023-10-26 RX ORDER — DULOXETIN HYDROCHLORIDE 60 MG/1
60 CAPSULE, DELAYED RELEASE ORAL DAILY
Qty: 30 CAPSULE | Refills: 3 | Status: SHIPPED | OUTPATIENT
Start: 2023-10-26 | End: 2024-01-26 | Stop reason: SDUPTHER

## 2023-10-26 NOTE — PATIENT INSTRUCTIONS
"Your recent lab work showed an elevation in your cholesterol and/or triglycerides.   This may increase your risk for heart disease in the future. Initial treatment will consist of a reduced fat diet and exercise. Try and make most of your fat intake from monounsaturated fats and limit saturated fats. Keep in mind that just because a label says "no cholesterol" doesn't mean it's healthy. It could still be high in saturated fat so read those labels!  I would like to recheck your labs in 6-12 months. Should you have any further questions, we can discuss it at your next regular appointment or you can make an appointment sooner to review these labs if you prefer    "

## 2023-10-26 NOTE — PROGRESS NOTES
Subjective:       Patient ID: Alivia Asencio is a 48 y.o. female.    Chief Complaint: Hypertension (6 weeks follow up)    Here for follow up on blood pressure and labs.  She reports she feels better overall.  Good response to gabapentin but finds it only lasts a few hours.  She does note palpitations about once a week, usually associated with anxiety.  She wants to be more active and due more but her pain and issues with crowds limit her.  Having some issues with insurance getting colonoscopy scheduled.      Review of Systems   Constitutional:  Positive for fatigue. Negative for activity change, appetite change, chills, diaphoresis, fever and unexpected weight change.        Hotflashes   HENT:  Negative for congestion, ear discharge, ear pain, hearing loss, mouth sores, nosebleeds, postnasal drip, rhinorrhea, sinus pressure, sinus pain, sneezing, sore throat, tinnitus, trouble swallowing and voice change.    Eyes:  Negative for photophobia, pain, discharge, redness, itching and visual disturbance.   Respiratory:  Negative for apnea, cough, choking, chest tightness, shortness of breath and wheezing.    Cardiovascular:  Positive for palpitations. Negative for chest pain and leg swelling.   Gastrointestinal:  Positive for abdominal pain. Negative for abdominal distention, blood in stool, constipation, diarrhea, nausea and vomiting.   Endocrine: Negative for cold intolerance, heat intolerance, polydipsia and polyuria.   Genitourinary:  Negative for decreased urine volume, difficulty urinating, dyspareunia, dysuria, enuresis, frequency, genital sores, hematuria, menstrual problem, pelvic pain, urgency, vaginal bleeding, vaginal discharge and vaginal pain.   Musculoskeletal:  Positive for arthralgias, back pain, myalgias, neck pain and neck stiffness. Negative for gait problem and joint swelling.   Skin:  Negative for rash and wound.   Allergic/Immunologic: Negative for environmental allergies, food allergies and  immunocompromised state.   Neurological:  Negative for dizziness, tremors, seizures, syncope, facial asymmetry, speech difficulty, weakness, light-headedness, numbness and headaches.   Hematological:  Negative for adenopathy. Does not bruise/bleed easily.   Psychiatric/Behavioral:  Positive for sleep disturbance. Negative for confusion, decreased concentration, hallucinations, self-injury and suicidal ideas. The patient is not nervous/anxious.        Past Medical History:   Diagnosis Date    DDD (degenerative disc disease), cervical     DDD (degenerative disc disease), lumbar     Known health problems: none       Past Surgical History:   Procedure Laterality Date    CYST REMOVAL      ovaries     HYSTEROSCOPY WITH DILATION AND CURETTAGE OF UTERUS N/A 2018    Procedure: HYSTEROSCOPY, WITH DILATION AND CURETTAGE OF UTERUS;  Surgeon: Agustín Iraheta MD;  Location: [facility] OR;  Service: OB/GYN;  Laterality: N/A;    NECK SURGERY  2022    THERMAL ABLATION OF ENDOMETRIUM N/A 2018    Procedure: ABLATION, ENDOMETRIUM, THERMAL;  Surgeon: Agustín Iraheta MD;  Location: [facility] OR;  Service: OB/GYN;  Laterality: N/A;    TUBAL LIGATION         Family History   Problem Relation Age of Onset    Diabetes Paternal Grandmother     Diabetes Maternal Grandmother     Prostate cancer Father     COPD Mother        Social History     Socioeconomic History    Marital status:     Number of children: 2   Occupational History    Occupation: disabled   Tobacco Use    Smoking status: Former     Current packs/day: 0.00     Average packs/day: 1 pack/day for 20.0 years (20.0 ttl pk-yrs)     Types: Cigarettes     Quit date: 2023     Years since quittin.5    Smokeless tobacco: Never   Substance and Sexual Activity    Alcohol use: Not Currently    Drug use: Not Currently     Types: Marijuana    Sexual activity: Yes     Partners: Male     Birth control/protection: None, Surgical, See Surgical Hx   Social History Narrative     "Live with        Current Outpatient Medications   Medication Sig Dispense Refill    HYDROcodone-acetaminophen (NORCO)  mg per tablet Take 1 tablet by mouth every 8 (eight) hours as needed.      lisinopriL (PRINIVIL,ZESTRIL) 20 MG tablet Take 1 tablet (20 mg total) by mouth once daily. 90 tablet 1    DULoxetine (CYMBALTA) 60 MG capsule Take 1 capsule (60 mg total) by mouth once daily. 30 capsule 3    gabapentin (NEURONTIN) 300 MG capsule Take 1 capsule (300 mg total) by mouth 3 (three) times daily. 270 capsule 1     No current facility-administered medications for this visit.       Review of patient's allergies indicates:   Allergen Reactions    Nsaids (non-steroidal anti-inflammatory drug) Other (See Comments)    Hydrocodone-acetaminophen Other (See Comments)    Pcn [penicillins]      Objective:    HPI     Hypertension     Additional comments: 6 weeks follow up          Last edited by Marissa Nunez MA on 10/26/2023  9:22 AM.      Blood pressure 108/80, pulse 86, temperature 99.9 °F (37.7 °C), temperature source Temporal, height 5' 2" (1.575 m), weight 109.3 kg (241 lb), SpO2 97 %. Body mass index is 44.08 kg/m².   Physical Exam  Vitals and nursing note reviewed.   Constitutional:       General: She is not in acute distress.     Appearance: She is well-developed. She is obese. She is not ill-appearing, toxic-appearing or diaphoretic.   HENT:      Head: Normocephalic and atraumatic.   Eyes:      General: No scleral icterus.        Right eye: No discharge.         Left eye: No discharge.      Conjunctiva/sclera: Conjunctivae normal.   Neck:      Vascular: No carotid bruit.   Cardiovascular:      Rate and Rhythm: Normal rate and regular rhythm.      Heart sounds: Normal heart sounds. No murmur heard.  Pulmonary:      Effort: Pulmonary effort is normal. No respiratory distress.      Breath sounds: Normal breath sounds. No decreased breath sounds, wheezing, rhonchi or rales.   Abdominal:      General: " There is no distension.      Palpations: Abdomen is soft.      Tenderness: There is no abdominal tenderness. There is no guarding or rebound.   Musculoskeletal:      Right lower leg: No edema.      Left lower leg: No edema.   Skin:     General: Skin is warm and dry.   Neurological:      Mental Status: She is alert.      Motor: No tremor.   Psychiatric:         Mood and Affect: Mood normal.         Speech: Speech normal.         Behavior: Behavior normal.           No visits with results within 1 Month(s) from this visit.   Latest known visit with results is:   Lab Visit on 09/19/2023   Component Date Value Ref Range Status    WBC 09/19/2023 5.81  3.90 - 12.70 K/uL Final    RBC 09/19/2023 4.43  4.00 - 5.40 M/uL Final    Hemoglobin 09/19/2023 14.7  12.0 - 16.0 g/dL Final    Hematocrit 09/19/2023 45.4  37.0 - 48.5 % Final    MCV 09/19/2023 103 (H)  82 - 98 fL Final    MCH 09/19/2023 33.2 (H)  27.0 - 31.0 pg Final    MCHC 09/19/2023 32.4  32.0 - 36.0 g/dL Final    RDW 09/19/2023 12.0  11.5 - 14.5 % Final    Platelets 09/19/2023 239  150 - 450 K/uL Final    MPV 09/19/2023 11.2  9.2 - 12.9 fL Final    Immature Granulocytes 09/19/2023 0.2  0.0 - 0.5 % Final    Gran # (ANC) 09/19/2023 2.7  1.8 - 7.7 K/uL Final    Immature Grans (Abs) 09/19/2023 0.01  0.00 - 0.04 K/uL Final    Comment: Mild elevation in immature granulocytes is non specific and   can be seen in a variety of conditions including stress response,   acute inflammation, trauma and pregnancy. Correlation with other   laboratory and clinical findings is essential.      Lymph # 09/19/2023 2.4  1.0 - 4.8 K/uL Final    Mono # 09/19/2023 0.6  0.3 - 1.0 K/uL Final    Eos # 09/19/2023 0.1  0.0 - 0.5 K/uL Final    Baso # 09/19/2023 0.02  0.00 - 0.20 K/uL Final    nRBC 09/19/2023 0  0 /100 WBC Final    Gran % 09/19/2023 46.9  38.0 - 73.0 % Final    Lymph % 09/19/2023 41.1  18.0 - 48.0 % Final    Mono % 09/19/2023 10.5  4.0 - 15.0 % Final    Eosinophil % 09/19/2023 1.0   0.0 - 8.0 % Final    Basophil % 09/19/2023 0.3  0.0 - 1.9 % Final    Differential Method 09/19/2023 Automated   Final    Sodium 09/19/2023 139  136 - 145 mmol/L Final    Potassium 09/19/2023 4.3  3.5 - 5.1 mmol/L Final    Chloride 09/19/2023 102  95 - 110 mmol/L Final    CO2 09/19/2023 26  23 - 29 mmol/L Final    Glucose 09/19/2023 91  70 - 110 mg/dL Final    BUN 09/19/2023 10  6 - 20 mg/dL Final    Creatinine 09/19/2023 0.7  0.5 - 1.4 mg/dL Final    Calcium 09/19/2023 9.8  8.7 - 10.5 mg/dL Final    Total Protein 09/19/2023 7.7  6.0 - 8.4 g/dL Final    Albumin 09/19/2023 4.2  3.5 - 5.2 g/dL Final    Total Bilirubin 09/19/2023 0.6  0.1 - 1.0 mg/dL Final    Comment: For infants and newborns, interpretation of results should be based  on gestational age, weight and in agreement with clinical  observations.    Premature Infant recommended reference ranges:  Up to 24 hours.............<8.0 mg/dL  Up to 48 hours............<12.0 mg/dL  3-5 days..................<15.0 mg/dL  6-29 days.................<15.0 mg/dL      Alkaline Phosphatase 09/19/2023 56  55 - 135 U/L Final    AST 09/19/2023 24  10 - 40 U/L Final    ALT 09/19/2023 22  10 - 44 U/L Final    eGFR 09/19/2023 >60.0  >60 mL/min/1.73 m^2 Final    Anion Gap 09/19/2023 11  8 - 16 mmol/L Final    Cholesterol 09/19/2023 257 (H)  120 - 199 mg/dL Final    Comment: The National Cholesterol Education Program (NCEP) has set the  following guidelines (reference ranges) for Cholesterol:  Optimal.....................<200 mg/dL  Borderline High.............200-239 mg/dL  High........................> or = 240 mg/dL      Triglycerides 09/19/2023 250 (H)  30 - 150 mg/dL Final    Comment: The National Cholesterol Education Program (NCEP) has set the  following guidelines (reference values) for triglycerides:  Normal......................<150 mg/dL  Borderline High.............150-199 mg/dL  High........................200-499 mg/dL      HDL 09/19/2023 52  40 - 75 mg/dL Final     Comment: The National Cholesterol Education Program (NCEP) has set the  following guidelines (reference values) for HDL Cholesterol:  Low...............<40 mg/dL  Optimal...........>60 mg/dL      LDL Cholesterol 09/19/2023 155.0  63.0 - 159.0 mg/dL Final    Comment: The National Cholesterol Education Program (NCEP) has set the  following guidelines (reference values) for LDL Cholesterol:  Optimal.......................<130 mg/dL  Borderline High...............130-159 mg/dL  High..........................160-189 mg/dL  Very High.....................>190 mg/dL      HDL/Cholesterol Ratio 09/19/2023 20.2  20.0 - 50.0 % Final    Total Cholesterol/HDL Ratio 09/19/2023 4.9  2.0 - 5.0 Final    Non-HDL Cholesterol 09/19/2023 205  mg/dL Final    Comment: Risk category and Non-HDL cholesterol goals:  Coronary heart disease (CHD)or equivalent (10-year risk of CHD >20%):  Non-HDL cholesterol goal     <130 mg/dL  Two or more CHD risk factors and 10-year risk of CHD <= 20%:  Non-HDL cholesterol goal     <160 mg/dL  0 to 1 CHD risk factor:  Non-HDL cholesterol goal     <190 mg/dL      TSH 09/19/2023 2.130  0.340 - 5.600 uIU/mL Final    Hepatitis C Ab 09/19/2023 Non Reactive  Non Reactive Final    Comment: HCV antibody alone does not differentiate  between previously resolved infection and  active infection. Equivocal and Reactive  HCV antibody results should be followed up  with an HCV RNA test to support the  diagnosis of active HCV infection.  Performed at:  [facility]  [id number]  : Manuel Granger MD, Phone:  [phone]      HIV Screen 4th Generation wRfx 09/19/2023 Non Reactive  Non Reactive Final    Comment: HIV Negative  HIV-1/HIV-2 antibodies and HIV-1 p24 antigen  were NOT detected. There is no laboratory  evidence of HIV infection.  Performed at:  [facility]  [id number]  : Manuel Granger MD, Phone:   [phone]      Specimen UA 09/19/2023 Urine, Clean Catch   Final    Color, UA 09/19/2023 Colorless (A)  Yellow, Straw, Danya Final    Appearance, UA 09/19/2023 Clear  Clear Final    pH, UA 09/19/2023 8.0  5.0 - 8.0 Final    Specific Gravity, UA 09/19/2023 1.005  1.005 - 1.030 Final    Protein, UA 09/19/2023 Negative  Negative Final    Comment: Recommend a 24 hour urine protein or a urine   protein/creatinine ratio if globulin induced proteinuria is  clinically suspected.      Glucose, UA 09/19/2023 Negative  Negative Final    Ketones, UA 09/19/2023 Negative  Negative Final    Bilirubin (UA) 09/19/2023 Negative  Negative Final    Occult Blood UA 09/19/2023 Negative  Negative Final    Nitrite, UA 09/19/2023 Negative  Negative Final    Urobilinogen, UA 09/19/2023 Negative  Negative EU/dL Final    Leukocytes, UA 09/19/2023 Negative  Negative Final   ]  Assessment:       1. Primary hypertension    2. Lumbar radiculopathy    3. Mixed hyperlipidemia    4. Anxiety        Plan:       Alivia was seen today for hypertension.    Diagnoses and all orders for this visit:    Primary hypertension  Comments:  Good response to meds.    Lumbar radiculopathy  -     gabapentin (NEURONTIN) 300 MG capsule; Take 1 capsule (300 mg total) by mouth 3 (three) times daily.  -     DULoxetine (CYMBALTA) 60 MG capsule; Take 1 capsule (60 mg total) by mouth once daily.    Mixed hyperlipidemia  Comments:  Discussed diet, exercise    Anxiety  -     DULoxetine (CYMBALTA) 60 MG capsule; Take 1 capsule (60 mg total) by mouth once daily.

## 2023-12-20 NOTE — PROGRESS NOTES
"[facility]  Pelvic Health Physical Therapy Initial Evaluation    Date: 2023   Name: Alivia Asencio  Clinic Number: [id number]  Therapy Diagnosis:   Encounter Diagnoses   Name Primary?    Pelvic floor dysfunction Yes    Dyspareunia, female     Mixed stress and urge urinary incontinence      Physician: Joanne Peters*    Physician Orders: PT Eval and Treat   Medical Diagnosis from Referral: Pain in female genitalia on intercourse [N94.10]   Evaluation Date: 2023  Authorization Period Expiration: 10/19/2024  Plan of Care Expiration: 3/21/2024  Visit # / Visits authorized:     FOTO 1/3 ON 2023      Time In: 10:00  Time Out: 10:45  Total Appointment Time (timed & untimed codes): 45 minutes    Precautions: universal    Subjective     Date of onset: in the last year     History of current condition -  reports: hasn't had sex in about 4 years because of her pain with neck/back. Finally got her neck/back feeling better and she wants to do more things/be sexually active and now she has significant pain with sex.     OB/GYN History: , vaginal delivery, episiotomy, perineal laceration, and caesarean (first pregnancy was stillborn twins)   Sexually active? Sometimes   Pain with vaginal exams, intercourse or tampon use? Yes - everything after initial penetration hurts. Unable to orgasm because she's in too much pain. She's avoiding sex because of the pain.       Bladder/Bowel History: "I can't pee all day, but I'm up 4x/night peeing" - patient states she has the urge to urinate during day but it's really hard to get anything out, but then at night she has full voids. Not sure when it started, estimates this year   Frequency of urination:   Daytime: once every 2 hrs gets an urge            Nighttime: about 4x/night   Difficulty initiating urine stream: yes during the day   Urine stream: weak during the day   Complete emptying: not during the day   Bladder leakage: Yes - " patient states she has BARBY   Amount leaked (urine): few drops, teaspoon(s), and small squirt   Urinary Urgency: Yes, has urgency during the day and sometimes has urinary incontinence   Frequency of bowel movements: once a day  Difficulty initiating BM: No  Quality/Shape of BM: easy to pass   Form of protection: none         Medical History:   has a past medical history of DDD (degenerative disc disease), cervical, DDD (degenerative disc disease), lumbar, and Known health problems: none.     Surgical History: Alivia Asencio  has a past surgical history that includes Tubal ligation; Cyst Removal; Hysteroscopy with dilation and curettage of uterus (N/A, 08/08/2018); Thermal ablation of endometrium (N/A, 08/08/2018); and Neck surgery (09/11/2022).    Medications: Alivia has a current medication list which includes the following prescription(s): duloxetine, gabapentin, hydrocodone-acetaminophen, and lisinopril.    Allergies:   Review of patient's allergies indicates:   Allergen Reactions    Nsaids (non-steroidal anti-inflammatory drug) Other (See Comments)    Hydrocodone-acetaminophen Other (See Comments)    Pcn [penicillins]         Prior Level of Function: independent   Current Level of Function: see above       Pts goals: resolve pain     OBJECTIVE     See EMR under MEDIA for written consent provided 12/21/2023  Chaperone: declined      BREATHING MECHANICS ASSESSMENT   Thorax Assessment During Quiet Respiration: WNL excursion of ribcage and WNL excursion of abdominal wall  Thorax Assessment During Deep Respiration: Decreased excursion bilaterally of lateral ribs  and Decreased excursion of abdominal wall     VAGINAL PELVIC FLOOR EXAM    EXTERNAL ASSESSMENT  Introitus: WNL  Skin condition: WNL  Scarring: none   Sensation: WNL   Pain: none  Voluntary contraction: visible lift  Voluntary relaxation: visible drop  Involuntary contraction: visible drop  Bearing down: visible drop  Perineal descent:  absent      INTERNAL ASSESSMENT  Pain: trigger points as follows: throughout    Sensation: able to localized pressure appropriately   Vaginal vault: asymmetrical   Muscle Bulk: hypertonus   Muscle Power: 3/5  Muscle Endurance: 3 sec    Quality of contraction: decreased hold   Specificity: WNL   Coordination: tends to hold breath during PFM contration   Prolapse check: anterior wall descent         Limitation/Restriction for FOTO Pelvic Survey    Therapist reviewed FOTO scores for Alivia Asencio on 12/21/2023.   FOTO documents entered into [employer] - see Media section.         TREATMENT     Treatment Time In: 10:15  Treatment Time Out: 10:45  Total Treatment time (time-based codes) separate from Evaluation: 30 minutes      Therapeutic Activity Patient participated in dynamic functional therapeutic activities to improve functional performance for 30 minutes. Including: Education as described below.       Patient Education provided:   general anatomy/physiology of urinary/ bowel  system and benefits of treatment were discussed with the pt. Additionally, anatomy/physiology of pelvic floor, diaphragmatic breathing, and behavior modifications were reviewed.     Home Exercises provided:  Written Home Exercises provided: yes.  Exercises were reviewed and  was able to demonstrate them prior to the end of the session.     demonstrated good  understanding of the education provided.     See EMR under Patient Instructions for exercises provided 12/21/2023.    Assessment     Alivia is a 48 y.o. female referred to outpatient Physical Therapy with a medical diagnosis of Pain in female genitalia on intercourse [N94.10] . Pt presents with poor knowledge of body mechanics and posture, pelvic floor tenderness, decreased pelvic muscle strength, decreased endurance of the pelvic muscles, decreased phasic ability of the pelvic muscles, increased tension of the pelvic muscles, poor quality of pelvic muscle contraction, increased  frequency of urination, increased nocturia, poor coordination of pelvic floor muscles during ADL's leading to urinary or fecal leakage, dysfunctional voiding, and unable to co-contract or co-relax abdominal wall and pelvic floor muscles.      Pt prognosis is Good.   Pt will benefit from skilled outpatient Physical Therapy to address the deficits stated above and in the chart below, provide pt/family education, and to maximize pt's level of independence.     Plan of care discussed with patient: Yes  Pt's spiritual, cultural and educational needs considered and patient is agreeable to the plan of care and goals as stated below:     Anticipated Barriers for therapy: none    Medical Necessity is demonstrated by the following  History  Co-morbidities and personal factors that may impact the plan of care [] LOW: no personal factors / co-morbidities  [x] MODERATE: 1-2 personal factors / co-morbidities  [] HIGH: 3+ personal factors / co-morbidities    Moderate / High Support Documentation:   Co-morbidities affecting plan of care:  has a past medical history of DDD (degenerative disc disease), cervical, DDD (degenerative disc disease), lumbar, and Known health problems: none.     Personal Factors:   coping style     Examination  Body Structures and Functions, activity limitations and participation restrictions that may impact the plan of care [] LOW: addressing 1-2 elements  [x] MODERATE: 3+ elements  [] HIGH: 4+ elements (please support below)    Moderate / High Support Documentation: see evaluation     Clinical Presentation [] LOW: stable  [x] MODERATE: Evolving  [] HIGH: Unstable     Decision Making/ Complexity Score: moderate         Goals:  Short Term Goals: 6 weeks   - Pt will demonstrate excellent knowledge and adherence to HEP to facilitate optimal recovery.  - Pt will demonstrate proper PFM contraction, relaxation, and lengthening coordinated with TA and breath for improved muscle coordination needed for functional  activity.    Long Term Goals: 12 weeks   - Pt will demonstrate excellent knowledge and adherence to HEP for continued self-maintenance of symptoms.  - Pt will report FOTO score of 10% improvement or more indicating clinically relevant increase in function.  - Pt will report voiding interval of 2-3 hours for improved ADL tolerance.  - Pt will report ability to delay urinary urge for at least 15 minutes to maintain continence with ADL/IADLs.   - Pt will report little (drops) to no incidence of urinary incontinence 6/7 days for improved hygiene and ADL/IADL tolerance.   - Pt will demonstrate PFM strength of at least 3/5 MMT x10 seconds for improved strength needed to maintain continence.   - Pt will be able to successfully complete sexual intercourse without pain for improved ADL tolerance.         Plan     Plan of care Certification: 12/21/2023 to 3/21/2024.    Outpatient Physical Therapy 2 times weekly for 12 weeks to include the following interventions: therapeutic exercises, therapeutic activity, neuromuscular re-education, manual therapy, modalities PRN, patient/family education, dry needling, and self care/home management    Heidy Coelho, PT, DPT, WCS

## 2023-12-21 ENCOUNTER — CLINICAL SUPPORT (OUTPATIENT)
Dept: REHABILITATION | Facility: HOSPITAL | Age: 48
End: 2023-12-21
Payer: COMMERCIAL

## 2023-12-21 DIAGNOSIS — M62.89 PELVIC FLOOR DYSFUNCTION: Primary | ICD-10-CM

## 2023-12-21 DIAGNOSIS — N39.46 MIXED STRESS AND URGE URINARY INCONTINENCE: ICD-10-CM

## 2023-12-21 DIAGNOSIS — N94.10 DYSPAREUNIA, FEMALE: ICD-10-CM

## 2023-12-21 PROCEDURE — 97162 PT EVAL MOD COMPLEX 30 MIN: CPT | Mod: PO

## 2023-12-21 PROCEDURE — 97530 THERAPEUTIC ACTIVITIES: CPT | Mod: PO

## 2023-12-21 NOTE — PLAN OF CARE
"[facility]  Pelvic Health Physical Therapy Initial Evaluation    Date: 2023   Name: Alivia Asencio  Clinic Number: [id number]  Therapy Diagnosis:   Encounter Diagnoses   Name Primary?    Pelvic floor dysfunction Yes    Dyspareunia, female     Mixed stress and urge urinary incontinence      Physician: Joanne Peters*    Physician Orders: PT Eval and Treat   Medical Diagnosis from Referral: Pain in female genitalia on intercourse [N94.10]   Evaluation Date: 2023  Authorization Period Expiration: 10/19/2024  Plan of Care Expiration: 3/21/2024  Visit # / Visits authorized:     FOTO 1/3 ON 2023      Time In: 10:00  Time Out: 10:45  Total Appointment Time (timed & untimed codes): 45 minutes    Precautions: universal    Subjective     Date of onset: in the last year     History of current condition -  reports: hasn't had sex in about 4 years because of her pain with neck/back. Finally got her neck/back feeling better and she wants to do more things/be sexually active and now she has significant pain with sex.     OB/GYN History: , vaginal delivery, episiotomy, perineal laceration, and caesarean (first pregnancy was stillborn twins)   Sexually active? Sometimes   Pain with vaginal exams, intercourse or tampon use? Yes - everything after initial penetration hurts. Unable to orgasm because she's in too much pain. She's avoiding sex because of the pain.       Bladder/Bowel History: "I can't pee all day, but I'm up 4x/night peeing" - patient states she has the urge to urinate during day but it's really hard to get anything out, but then at night she has full voids. Not sure when it started, estimates this year   Frequency of urination:   Daytime: once every 2 hrs gets an urge            Nighttime: about 4x/night   Difficulty initiating urine stream: yes during the day   Urine stream: weak during the day   Complete emptying: not during the day   Bladder leakage: Yes - " patient states she has BARBY   Amount leaked (urine): few drops, teaspoon(s), and small squirt   Urinary Urgency: Yes, has urgency during the day and sometimes has urinary incontinence   Frequency of bowel movements: once a day  Difficulty initiating BM: No  Quality/Shape of BM: easy to pass   Form of protection: none         Medical History:   has a past medical history of DDD (degenerative disc disease), cervical, DDD (degenerative disc disease), lumbar, and Known health problems: none.     Surgical History: Alivia Asencio  has a past surgical history that includes Tubal ligation; Cyst Removal; Hysteroscopy with dilation and curettage of uterus (N/A, 08/08/2018); Thermal ablation of endometrium (N/A, 08/08/2018); and Neck surgery (09/11/2022).    Medications: Alivia has a current medication list which includes the following prescription(s): duloxetine, gabapentin, hydrocodone-acetaminophen, and lisinopril.    Allergies:   Review of patient's allergies indicates:   Allergen Reactions    Nsaids (non-steroidal anti-inflammatory drug) Other (See Comments)    Hydrocodone-acetaminophen Other (See Comments)    Pcn [penicillins]         Prior Level of Function: independent   Current Level of Function: see above       Pts goals: resolve pain     OBJECTIVE     See EMR under MEDIA for written consent provided 12/21/2023  Chaperone: declined      BREATHING MECHANICS ASSESSMENT   Thorax Assessment During Quiet Respiration: WNL excursion of ribcage and WNL excursion of abdominal wall  Thorax Assessment During Deep Respiration: Decreased excursion bilaterally of lateral ribs  and Decreased excursion of abdominal wall     VAGINAL PELVIC FLOOR EXAM    EXTERNAL ASSESSMENT  Introitus: WNL  Skin condition: WNL  Scarring: none   Sensation: WNL   Pain: none  Voluntary contraction: visible lift  Voluntary relaxation: visible drop  Involuntary contraction: visible drop  Bearing down: visible drop  Perineal descent:  absent      INTERNAL ASSESSMENT  Pain: trigger points as follows: throughout    Sensation: able to localized pressure appropriately   Vaginal vault: asymmetrical   Muscle Bulk: hypertonus   Muscle Power: 3/5  Muscle Endurance: 3 sec    Quality of contraction: decreased hold   Specificity: WNL   Coordination: tends to hold breath during PFM contration   Prolapse check: anterior wall descent         Limitation/Restriction for FOTO Pelvic Survey    Therapist reviewed FOTO scores for Alivia Asencio on 12/21/2023.   FOTO documents entered into [employer] - see Media section.         TREATMENT     Treatment Time In: 10:15  Treatment Time Out: 10:45  Total Treatment time (time-based codes) separate from Evaluation: 30 minutes      Therapeutic Activity Patient participated in dynamic functional therapeutic activities to improve functional performance for 30 minutes. Including: Education as described below.       Patient Education provided:   general anatomy/physiology of urinary/ bowel  system and benefits of treatment were discussed with the pt. Additionally, anatomy/physiology of pelvic floor, diaphragmatic breathing, and behavior modifications were reviewed.     Home Exercises provided:  Written Home Exercises provided: yes.  Exercises were reviewed and  was able to demonstrate them prior to the end of the session.     demonstrated good  understanding of the education provided.     See EMR under Patient Instructions for exercises provided 12/21/2023.    Assessment     Alivia is a 48 y.o. female referred to outpatient Physical Therapy with a medical diagnosis of Pain in female genitalia on intercourse [N94.10] . Pt presents with poor knowledge of body mechanics and posture, pelvic floor tenderness, decreased pelvic muscle strength, decreased endurance of the pelvic muscles, decreased phasic ability of the pelvic muscles, increased tension of the pelvic muscles, poor quality of pelvic muscle contraction, increased  frequency of urination, increased nocturia, poor coordination of pelvic floor muscles during ADL's leading to urinary or fecal leakage, dysfunctional voiding, and unable to co-contract or co-relax abdominal wall and pelvic floor muscles.      Pt prognosis is Good.   Pt will benefit from skilled outpatient Physical Therapy to address the deficits stated above and in the chart below, provide pt/family education, and to maximize pt's level of independence.     Plan of care discussed with patient: Yes  Pt's spiritual, cultural and educational needs considered and patient is agreeable to the plan of care and goals as stated below:     Anticipated Barriers for therapy: none    Medical Necessity is demonstrated by the following  History  Co-morbidities and personal factors that may impact the plan of care [] LOW: no personal factors / co-morbidities  [x] MODERATE: 1-2 personal factors / co-morbidities  [] HIGH: 3+ personal factors / co-morbidities    Moderate / High Support Documentation:   Co-morbidities affecting plan of care:  has a past medical history of DDD (degenerative disc disease), cervical, DDD (degenerative disc disease), lumbar, and Known health problems: none.     Personal Factors:   coping style     Examination  Body Structures and Functions, activity limitations and participation restrictions that may impact the plan of care [] LOW: addressing 1-2 elements  [x] MODERATE: 3+ elements  [] HIGH: 4+ elements (please support below)    Moderate / High Support Documentation: see evaluation     Clinical Presentation [] LOW: stable  [x] MODERATE: Evolving  [] HIGH: Unstable     Decision Making/ Complexity Score: moderate         Goals:  Short Term Goals: 6 weeks   - Pt will demonstrate excellent knowledge and adherence to HEP to facilitate optimal recovery.  - Pt will demonstrate proper PFM contraction, relaxation, and lengthening coordinated with TA and breath for improved muscle coordination needed for functional  activity.    Long Term Goals: 12 weeks   - Pt will demonstrate excellent knowledge and adherence to HEP for continued self-maintenance of symptoms.  - Pt will report FOTO score of 10% improvement or more indicating clinically relevant increase in function.  - Pt will report voiding interval of 2-3 hours for improved ADL tolerance.  - Pt will report ability to delay urinary urge for at least 15 minutes to maintain continence with ADL/IADLs.   - Pt will report little (drops) to no incidence of urinary incontinence 6/7 days for improved hygiene and ADL/IADL tolerance.   - Pt will demonstrate PFM strength of at least 3/5 MMT x10 seconds for improved strength needed to maintain continence.   - Pt will be able to successfully complete sexual intercourse without pain for improved ADL tolerance.         Plan     Plan of care Certification: 12/21/2023 to 3/21/2024.    Outpatient Physical Therapy 2 times weekly for 12 weeks to include the following interventions: therapeutic exercises, therapeutic activity, neuromuscular re-education, manual therapy, modalities PRN, patient/family education, dry needling, and self care/home management    Heidy Coelho, PT, DPT, WCS

## 2023-12-21 NOTE — PATIENT INSTRUCTIONS
"Home Exercise Program: 12/21/2023    DIAPHRAGMATIC BREATHING     The diaphragm is a dome shaped muscle that forms the floor of the rib cage. It is the most efficient muscle for breathing and relaxation, although most people are not used to using the diaphragm. Diaphragmatic or belly breathing is an important technique to learn because it helps settle down or relax the autonomic nervous system. The correct use of diaphragmatic breathing can help to quiet brain activity resulting in the relaxation of all the muscles and organs of the body. This is accomplished by slow rhythmic breathing concentrated in the diaphragm muscle rather than the chest.    How to do proper relaxation breathing:    Start by lying on your back or reclining in a chair in a relaxed position. Place one hand on your chest and the other on your abdomen.  Relax your jaw by placing your tongue on the floor of your mouth and keeping your teeth slightly apart.   Take a deep breath in, letting the abdomen expand and rise while you keep your upper chest, neck and shoulders relaxed.   As you breathe out, allow your abdomen and chest to fall. Exhale completely.  It doesn't matter if you breathe in/out through your nose and/or mouth. Do whichever feels comfortable.  Remember to breathe slowly.  Do not force your breathing. Do not hold your breath.  Repeat daily while doing stretches listed below:           "single knee to chest" - lay on your back, use your hands to pull your left knee to your chest, keeping the right leg straight on the bed. Hold this pose while you incorporate your diaphragmatic breathing. Repeat on the opposite side. Complete 10-20 breaths on both legs          "Modified Happy Baby" - lay on your back, use your hands to pull your knees to your chest, then bring them out wide (about even with your shoulders). Hold this pose while you incorporate your deep breathing. Complete 10-20 breaths.        "Child's Pose" - first start by getting " onto your hands and knees, then move your feet so they are touching and your knees are wider than your hips. Sit back so that you are sitting on your heels and reach your arms forward. Think about resting in this position. Complete 10-20 breaths.      CHILD'S POSE VARIATION:

## 2023-12-27 NOTE — PROGRESS NOTES
Pelvic Health Physical Therapy   Treatment Note     Name: Alivia Asencio  Clinic Number: [id number]    Therapy Diagnosis:   Encounter Diagnoses   Name Primary?    Pelvic floor dysfunction Yes    Dyspareunia, female     Mixed stress and urge urinary incontinence      Physician: Joanne Peters*    Visit Date: 12/29/2023    Physician Orders: PT Eval and Treat   Medical Diagnosis from Referral: Pain in female genitalia on intercourse [N94.10]   Evaluation Date: 12/21/2023  Authorization Period Expiration: 10/19/2024  Plan of Care Expiration: 3/21/2024  Visit # / Visits authorized: 1/ 1     FOTO 1/3 ON 12/21/2023       Time In: 10:45  Time Out: 1130  Total Appointment Time (timed & untimed codes): 45 minutes     Precautions: universal    Subjective     Pt reports: She is off of her loratab for the first time in 3 years. Reports she quit taking her medication 2 days ago because her doctor quit taking her insurance. She has been out of town for maeve and hasn't had a chance to attempt her Home Exercise Program as of yet. Reports when it comes to sex she has no pain with insertion but everything else about sex is painful.     She was compliant with home exercise program.  Response to previous treatment: first visit post initial evaluation   Functional change: non-remarkable     Pain: 0/10  Location: no pelvic pain upon arrival       Objective        received the following manual therapy techniques: to develop flexibility, extensibility, and desensitization for 30 minutes including: trigger point/myofascial release and soft tissue mobilization of pelvic floor musculature       participated in neuromuscular re-education activities to develop Coordination, Control, Down training, Proprioception, Kinesthetic, and Sense for 0 minutes including:        participated in dynamic functional therapeutic activities to improve functional performance for 10  minutes, including:    Diaphragmatic  breathing  Education on importance of performing Home Exercise Program       Home Exercises Provided and Patient Education Provided     Education provided:   - general anatomy/physiology of urinary/ bowel  system and benefits of treatment were discussed with the pt. Additionally, anatomy/physiology of pelvic floor, diaphragmatic breathing, and behavior modifications were reviewed.   Discussed progression of plan of care with patient; educated pt in activity modification; reviewed HEP with pt.    Written Home Exercises Provided: yes.  Exercises were reviewed and  was able to demonstrate them prior to the end of the session.   demonstrated good  understanding of the education provided.     See EMR under Patient Instructions for exercises provided prior visit.    Assessment      was agreeable to intravaginal manual intervention. She noted with same point of tenderness as listed on initial evaluation with left side worse than right. Mild tissue softening noted with diaphragmatic breathing. Provided education on the importance of adhering to Home Exercise Program. Will benefit from skilled Physical Therapy to improve pelvic floor tension to allow her to return to IADL's without pain or limitations.      Is progressing well towards her goals.   Pt prognosis is Good.     Pt will continue to benefit from skilled outpatient physical therapy to address the deficits listed in the problem list box on initial evaluation, provide pt/family education and to maximize pt's level of independence in the home and community environment.     Pt's spiritual, cultural and educational needs considered and pt agreeable to plan of care and goals.     Anticipated barriers to physical therapy: none    Goals:     Short Term Goals: 6 weeks   - Pt will demonstrate excellent knowledge and adherence to HEP to facilitate optimal recovery.  - Pt will demonstrate proper PFM contraction, relaxation, and lengthening coordinated with  TA and breath for improved muscle coordination needed for functional activity.     Long Term Goals: 12 weeks   - Pt will demonstrate excellent knowledge and adherence to HEP for continued self-maintenance of symptoms.  - Pt will report FOTO score of 10% improvement or more indicating clinically relevant increase in function.  - Pt will report voiding interval of 2-3 hours for improved ADL tolerance.  - Pt will report ability to delay urinary urge for at least 15 minutes to maintain continence with ADL/IADLs.   - Pt will report little (drops) to no incidence of urinary incontinence 6/7 days for improved hygiene and ADL/IADL tolerance.   - Pt will demonstrate PFM strength of at least 3/5 MMT x10 seconds for improved strength needed to maintain continence.   - Pt will be able to successfully complete sexual intercourse without pain for improved ADL tolerance.     Plan     Progress as per POC.     Maral Youssef, PTA

## 2023-12-29 ENCOUNTER — CLINICAL SUPPORT (OUTPATIENT)
Dept: REHABILITATION | Facility: HOSPITAL | Age: 48
End: 2023-12-29
Payer: COMMERCIAL

## 2023-12-29 DIAGNOSIS — N39.46 MIXED STRESS AND URGE URINARY INCONTINENCE: ICD-10-CM

## 2023-12-29 DIAGNOSIS — M62.89 PELVIC FLOOR DYSFUNCTION: Primary | ICD-10-CM

## 2023-12-29 DIAGNOSIS — N94.10 DYSPAREUNIA, FEMALE: ICD-10-CM

## 2023-12-29 PROCEDURE — 97530 THERAPEUTIC ACTIVITIES: CPT | Mod: PO,CQ

## 2023-12-29 PROCEDURE — 97140 MANUAL THERAPY 1/> REGIONS: CPT | Mod: PO,CQ

## 2024-01-03 ENCOUNTER — CLINICAL SUPPORT (OUTPATIENT)
Dept: REHABILITATION | Facility: HOSPITAL | Age: 49
End: 2024-01-03
Payer: COMMERCIAL

## 2024-01-03 DIAGNOSIS — N94.10 DYSPAREUNIA, FEMALE: ICD-10-CM

## 2024-01-03 DIAGNOSIS — I10 PRIMARY HYPERTENSION: ICD-10-CM

## 2024-01-03 DIAGNOSIS — N39.46 MIXED STRESS AND URGE URINARY INCONTINENCE: ICD-10-CM

## 2024-01-03 DIAGNOSIS — M62.89 PELVIC FLOOR DYSFUNCTION: Primary | ICD-10-CM

## 2024-01-03 PROCEDURE — 97140 MANUAL THERAPY 1/> REGIONS: CPT | Mod: PO,CQ

## 2024-01-03 PROCEDURE — 97530 THERAPEUTIC ACTIVITIES: CPT | Mod: PO,CQ

## 2024-01-03 RX ORDER — LISINOPRIL 20 MG/1
20 TABLET ORAL DAILY
Qty: 90 TABLET | Refills: 1 | Status: SHIPPED | OUTPATIENT
Start: 2024-01-03 | End: 2024-02-20 | Stop reason: SDUPTHER

## 2024-01-03 NOTE — PROGRESS NOTES
Pelvic Health Physical Therapy   Treatment Note     Name: Alivia Asencio  Clinic Number: [id number]    Therapy Diagnosis:   Encounter Diagnoses   Name Primary?    Pelvic floor dysfunction Yes    Dyspareunia, female     Mixed stress and urge urinary incontinence        Physician: Joanne Peters*    Visit Date: 1/3/2024    Physician Orders: PT Eval and Treat   Medical Diagnosis from Referral: Pain in female genitalia on intercourse [N94.10]   Evaluation Date: 12/21/2023  Authorization Period Expiration: 10/19/2024  Plan of Care Expiration: 3/21/2024  Visit # / Visits authorized: 1/ pending ( 3 total)      FOTO 1/3 ON 12/21/2023       Time In: 10:45  Time Out: 1130  Total Appointment Time (timed & untimed codes): 45 minutes     Precautions: universal    Subjective     Pt reports: She was able to participate in intercourse without pain however, unable to have an orgasm. She also reports improvement in ability to urinate during the day since last treatment session. Is able to notice when she is holding her pelvic floor tight and able to release it.     She was compliant with home exercise program.  Response to previous treatment: first visit post initial evaluation   Functional change: non-remarkable     Pain: 0/10  Location: no pelvic pain upon arrival       Objective        received the following manual therapy techniques: to develop flexibility, extensibility, and desensitization for 30 minutes including: trigger point/myofascial release and soft tissue mobilization of pelvic floor musculature       participated in neuromuscular re-education activities to develop Coordination, Control, Down training, Proprioception, Kinesthetic, and Sense for 0 minutes including:        participated in dynamic functional therapeutic activities to improve functional performance for 10  minutes, including:    Diaphragmatic breathing  Education on body mechanics especially when getting in and out of bed.      Home Exercises Provided and Patient Education Provided     Education provided:   - general anatomy/physiology of urinary/ bowel  system and benefits of treatment were discussed with the pt. Additionally, anatomy/physiology of pelvic floor, diaphragmatic breathing, and behavior modifications were reviewed.   Discussed progression of plan of care with patient; educated pt in activity modification; reviewed HEP with pt.    Written Home Exercises Provided: yes.  Exercises were reviewed and  was able to demonstrate them prior to the end of the session.   demonstrated good  understanding of the education provided.     See EMR under Patient Instructions for exercises provided prior visit.    Assessment      noted with significant reduction in pelvic tension this date with less tenderness to palpation. Good core coordination with diaphragmatic breathing. Will continue to progress as able to reduce pelvic floor tension.     Is progressing well towards her goals.   Pt prognosis is Good.     Pt will continue to benefit from skilled outpatient physical therapy to address the deficits listed in the problem list box on initial evaluation, provide pt/family education and to maximize pt's level of independence in the home and community environment.     Pt's spiritual, cultural and educational needs considered and pt agreeable to plan of care and goals.     Anticipated barriers to physical therapy: none    Goals:     Short Term Goals: 6 weeks   - Pt will demonstrate excellent knowledge and adherence to HEP to facilitate optimal recovery.  - Pt will demonstrate proper PFM contraction, relaxation, and lengthening coordinated with TA and breath for improved muscle coordination needed for functional activity.     Long Term Goals: 12 weeks   - Pt will demonstrate excellent knowledge and adherence to HEP for continued self-maintenance of symptoms.  - Pt will report FOTO score of 10% improvement or more indicating  clinically relevant increase in function.  - Pt will report voiding interval of 2-3 hours for improved ADL tolerance.  - Pt will report ability to delay urinary urge for at least 15 minutes to maintain continence with ADL/IADLs.   - Pt will report little (drops) to no incidence of urinary incontinence 6/7 days for improved hygiene and ADL/IADL tolerance.   - Pt will demonstrate PFM strength of at least 3/5 MMT x10 seconds for improved strength needed to maintain continence.   - Pt will be able to successfully complete sexual intercourse without pain for improved ADL tolerance.     Plan     Progress as per POC.     Maral Youssef, PTA

## 2024-01-10 ENCOUNTER — CLINICAL SUPPORT (OUTPATIENT)
Dept: REHABILITATION | Facility: HOSPITAL | Age: 49
End: 2024-01-10
Payer: COMMERCIAL

## 2024-01-10 DIAGNOSIS — N39.46 MIXED STRESS AND URGE URINARY INCONTINENCE: ICD-10-CM

## 2024-01-10 DIAGNOSIS — N94.10 DYSPAREUNIA, FEMALE: ICD-10-CM

## 2024-01-10 DIAGNOSIS — M62.89 PELVIC FLOOR DYSFUNCTION: Primary | ICD-10-CM

## 2024-01-10 PROCEDURE — 97530 THERAPEUTIC ACTIVITIES: CPT | Mod: PO

## 2024-01-10 PROCEDURE — 97140 MANUAL THERAPY 1/> REGIONS: CPT | Mod: PO

## 2024-01-10 NOTE — PROGRESS NOTES
Pelvic Health Physical Therapy   Treatment Note     Name: Alivia Asencio  Clinic Number: [id number]    Therapy Diagnosis:   Encounter Diagnoses   Name Primary?    Pelvic floor dysfunction Yes    Dyspareunia, female     Mixed stress and urge urinary incontinence        Physician: Joanne Peters*    Visit Date: 1/10/2024    Physician Orders: PT Eval and Treat   Medical Diagnosis from Referral: Pain in female genitalia on intercourse [N94.10]   Evaluation Date: 12/21/2023  Authorization Period Expiration: 10/19/2024  Plan of Care Expiration: 3/21/2024  Visit # / Visits authorized: 2 on current auth; 4 total      FOTO 1/3 ON 12/21/2023       Time In: 10:45  Time Out: 1130  Total Appointment Time (timed & untimed codes): 45 minutes     Precautions: universal    Subjective     Pt reports: she was able to have sex - didn't have pain during but was a little sore for a few days afterwards.   She just stopped pain medication because her doctor doesn't take her insurance anymore, she knew she had issues with her left leg, never realized how many issues she has with her right leg - starting to feel numbness/pain, greatly limits her activity.       She was compliant with home exercise program.  Response to previous treatment: first visit post initial evaluation   Functional change: non-remarkable     Pain: 0/10  Location: no pelvic pain upon arrival       Objective        received the following manual therapy techniques: to develop flexibility, extensibility, and desensitization for 30 minutes including: trigger point/myofascial release and soft tissue mobilization of pelvic floor musculature       participated in neuromuscular re-education activities to develop Coordination, Control, Down training, Proprioception, Kinesthetic, and Sense for 0 minutes including:        participated in dynamic functional therapeutic activities to improve functional performance for 10  minutes, including:  Brea  3x12 bilateral - added to home exercise program     NOT TODAY:  Diaphragmatic breathing  Education on body mechanics especially when getting in and out of bed.     Home Exercises Provided and Patient Education Provided     Education provided:   - general anatomy/physiology of urinary/ bowel  system and benefits of treatment were discussed with the pt. Additionally, anatomy/physiology of pelvic floor, diaphragmatic breathing, and behavior modifications were reviewed.   Discussed progression of plan of care with patient; educated pt in activity modification; reviewed HEP with pt.    Written Home Exercises Provided: yes.  Exercises were reviewed and  was able to demonstrate them prior to the end of the session.   demonstrated good  understanding of the education provided.     See EMR under Patient Instructions for exercises provided prior visit.    Assessment     Impairments with coordination of diaphragmatic breathing noted, likely due to guarding from increased pain. Patient reports she has started smoking again to cope with pain after quitting for 6 months; discussed how this negatively impacts health and can contribute to chronic pain. Added clamshells to start hip strengthening due to patient's decreased activity levels and fear-avoidance behaviors.      Is progressing towards her goals.   Pt prognosis is Fair.     Pt will continue to benefit from skilled outpatient physical therapy to address the deficits listed in the problem list box on initial evaluation, provide pt/family education and to maximize pt's level of independence in the home and community environment.     Pt's spiritual, cultural and educational needs considered and pt agreeable to plan of care and goals.     Anticipated barriers to physical therapy: none    Goals:     Short Term Goals: 6 weeks   - Pt will demonstrate excellent knowledge and adherence to HEP to facilitate optimal recovery.  - Pt will demonstrate proper PFM  contraction, relaxation, and lengthening coordinated with TA and breath for improved muscle coordination needed for functional activity.     Long Term Goals: 12 weeks   - Pt will demonstrate excellent knowledge and adherence to HEP for continued self-maintenance of symptoms.  - Pt will report FOTO score of 10% improvement or more indicating clinically relevant increase in function.  - Pt will report voiding interval of 2-3 hours for improved ADL tolerance.  - Pt will report ability to delay urinary urge for at least 15 minutes to maintain continence with ADL/IADLs.   - Pt will report little (drops) to no incidence of urinary incontinence 6/7 days for improved hygiene and ADL/IADL tolerance.   - Pt will demonstrate PFM strength of at least 3/5 MMT x10 seconds for improved strength needed to maintain continence.   - Pt will be able to successfully complete sexual intercourse without pain for improved ADL tolerance.     Plan     Progress as per POC.     Heidy Coelho, PT

## 2024-01-17 ENCOUNTER — CLINICAL SUPPORT (OUTPATIENT)
Dept: REHABILITATION | Facility: HOSPITAL | Age: 49
End: 2024-01-17
Payer: COMMERCIAL

## 2024-01-17 DIAGNOSIS — N39.46 MIXED STRESS AND URGE URINARY INCONTINENCE: ICD-10-CM

## 2024-01-17 DIAGNOSIS — N94.10 DYSPAREUNIA, FEMALE: ICD-10-CM

## 2024-01-17 DIAGNOSIS — M62.89 PELVIC FLOOR DYSFUNCTION: Primary | ICD-10-CM

## 2024-01-17 PROCEDURE — 97140 MANUAL THERAPY 1/> REGIONS: CPT | Mod: PO,CQ

## 2024-01-17 PROCEDURE — 97112 NEUROMUSCULAR REEDUCATION: CPT | Mod: PO,CQ

## 2024-01-17 NOTE — PROGRESS NOTES
Pelvic Health Physical Therapy   Treatment Note     Name: Alivia Asencio  Clinic Number: [id number]    Therapy Diagnosis:   Encounter Diagnoses   Name Primary?    Pelvic floor dysfunction Yes    Dyspareunia, female     Mixed stress and urge urinary incontinence        Physician: Joanne Peters*    Visit Date: 1/17/2024    Physician Orders: PT Eval and Treat   Medical Diagnosis from Referral: Pain in female genitalia on intercourse [N94.10]   Evaluation Date: 12/21/2023  Authorization Period Expiration: 10/19/2024  Plan of Care Expiration: 3/21/2024  Visit # / Visits authorized: 3 on current auth; 5 total      FOTO 1/3 ON 12/21/2023       Time In: 10:47  Time Out: 1145  Total Appointment Time (timed & untimed codes): 53 minutes     Precautions: universal    Subjective     Pt reports: She has been able to participate in intercourse 3 times since starting therapy. She feels like the last time she would have been able to orgasm but was fear full of UI therefore stopped. She reports she is still leaking some but not nearly as bad as it was. She feels like the leakage has reduced down to atleast every other day.       She was compliant with home exercise program.  Response to previous treatment: as noted  Functional change: non-remarkable     Pain: 0/10  Location: no pelvic pain upon arrival       Objective     Informed verbal consent provided 1/17/2024 prior to treatment.  Chaperone: declined      received the following manual therapy techniques: to develop flexibility, extensibility, and desensitization for 40 minutes including: trigger point/myofascial release and soft tissue mobilization of pelvic floor musculature       participated in neuromuscular re-education activities to develop Coordination, Control, Down training, Proprioception, Kinesthetic, and Sense for 13 minutes including:     Elevators 2 seconds holds 4 seconds release with manual biofeedback   Kegel 10 seconds holds x 2 ensuring  complete relaxation prior to second kegel   Prone press up with exhalation x 20 seconds   Standing extensions against the table to reduce radicular symptoms down her left lower extremity.      participated in dynamic functional therapeutic activities to improve functional performance for 0  minutes, including:  Clamshells 3x12 bilateral - added to home exercise program     NOT TODAY:  Diaphragmatic breathing  Education on body mechanics especially when getting in and out of bed.     Home Exercises Provided and Patient Education Provided     Education provided:   - general anatomy/physiology of urinary/ bowel  system and benefits of treatment were discussed with the pt. Additionally, anatomy/physiology of pelvic floor, diaphragmatic breathing, and behavior modifications were reviewed.   Discussed progression of plan of care with patient; educated pt in activity modification; reviewed HEP with pt.    Written Home Exercises Provided: yes.  Exercises were reviewed and  was able to demonstrate them prior to the end of the session.   demonstrated good  understanding of the education provided.     See EMR under Patient Instructions for exercises provided prior visit.    Assessment     Symptoms are improving as per subjective reporting. Continues to internal dysfunction evident by multiple spots of significant tenderness to palpation although improving since start of therapy. She verbalizes occasional radicular symptoms down her left leg therefore showed her options at home to help mitigate symptoms. She was receptive to education and instruction and was able to perform teach back. She demonstrates improved ability to relax pelvic floor musculature although slow to release at times. Discussed functional kegels to utilize during sneezing, coughing, or laughing.  will continue to benefit from skilled Physical Therapy to maximize strength gains as able.      Is progressing towards her goals.   Pt  prognosis is Fair.     Pt will continue to benefit from skilled outpatient physical therapy to address the deficits listed in the problem list box on initial evaluation, provide pt/family education and to maximize pt's level of independence in the home and community environment.     Pt's spiritual, cultural and educational needs considered and pt agreeable to plan of care and goals.     Anticipated barriers to physical therapy: none    Goals:     Short Term Goals: 6 weeks   - Pt will demonstrate excellent knowledge and adherence to HEP to facilitate optimal recovery.  - Pt will demonstrate proper PFM contraction, relaxation, and lengthening coordinated with TA and breath for improved muscle coordination needed for functional activity.     Long Term Goals: 12 weeks   - Pt will demonstrate excellent knowledge and adherence to HEP for continued self-maintenance of symptoms.  - Pt will report FOTO score of 10% improvement or more indicating clinically relevant increase in function.  - Pt will report voiding interval of 2-3 hours for improved ADL tolerance.  - Pt will report ability to delay urinary urge for at least 15 minutes to maintain continence with ADL/IADLs.   - Pt will report little (drops) to no incidence of urinary incontinence 6/7 days for improved hygiene and ADL/IADL tolerance.   - Pt will demonstrate PFM strength of at least 3/5 MMT x10 seconds for improved strength needed to maintain continence.   - Pt will be able to successfully complete sexual intercourse without pain for improved ADL tolerance.     Plan     Progress as per POC.     Maral Youssef, PTA

## 2024-01-26 ENCOUNTER — TELEPHONE (OUTPATIENT)
Dept: FAMILY MEDICINE | Facility: CLINIC | Age: 49
End: 2024-01-26

## 2024-01-26 ENCOUNTER — OFFICE VISIT (OUTPATIENT)
Dept: FAMILY MEDICINE | Facility: CLINIC | Age: 49
End: 2024-01-26
Payer: COMMERCIAL

## 2024-01-26 VITALS
BODY MASS INDEX: 43.88 KG/M2 | HEART RATE: 113 BPM | TEMPERATURE: 97 F | HEIGHT: 62 IN | DIASTOLIC BLOOD PRESSURE: 86 MMHG | WEIGHT: 238.44 LBS | OXYGEN SATURATION: 99 % | SYSTOLIC BLOOD PRESSURE: 120 MMHG

## 2024-01-26 DIAGNOSIS — M54.16 LUMBAR RADICULOPATHY: ICD-10-CM

## 2024-01-26 DIAGNOSIS — I10 PRIMARY HYPERTENSION: Primary | ICD-10-CM

## 2024-01-26 DIAGNOSIS — E78.2 MIXED HYPERLIPIDEMIA: ICD-10-CM

## 2024-01-26 DIAGNOSIS — Z23 NEED FOR PROPHYLACTIC VACCINATION AND INOCULATION AGAINST INFLUENZA: ICD-10-CM

## 2024-01-26 DIAGNOSIS — Z12.11 COLON CANCER SCREENING: ICD-10-CM

## 2024-01-26 DIAGNOSIS — F41.9 ANXIETY: ICD-10-CM

## 2024-01-26 DIAGNOSIS — H92.03 OTALGIA OF BOTH EARS: ICD-10-CM

## 2024-01-26 DIAGNOSIS — R10.13 EPIGASTRIC PAIN: ICD-10-CM

## 2024-01-26 PROCEDURE — 3074F SYST BP LT 130 MM HG: CPT | Mod: CPTII,S$GLB,, | Performed by: INTERNAL MEDICINE

## 2024-01-26 PROCEDURE — 4010F ACE/ARB THERAPY RXD/TAKEN: CPT | Mod: CPTII,S$GLB,, | Performed by: INTERNAL MEDICINE

## 2024-01-26 PROCEDURE — 3079F DIAST BP 80-89 MM HG: CPT | Mod: CPTII,S$GLB,, | Performed by: INTERNAL MEDICINE

## 2024-01-26 PROCEDURE — G0008 ADMIN INFLUENZA VIRUS VAC: HCPCS | Mod: S$GLB,,, | Performed by: INTERNAL MEDICINE

## 2024-01-26 PROCEDURE — 90686 IIV4 VACC NO PRSV 0.5 ML IM: CPT | Mod: S$GLB,,, | Performed by: INTERNAL MEDICINE

## 2024-01-26 PROCEDURE — 1159F MED LIST DOCD IN RCRD: CPT | Mod: CPTII,S$GLB,, | Performed by: INTERNAL MEDICINE

## 2024-01-26 PROCEDURE — 99214 OFFICE O/P EST MOD 30 MIN: CPT | Mod: S$GLB,,, | Performed by: INTERNAL MEDICINE

## 2024-01-26 PROCEDURE — 3008F BODY MASS INDEX DOCD: CPT | Mod: CPTII,S$GLB,, | Performed by: INTERNAL MEDICINE

## 2024-01-26 PROCEDURE — 99999 PR PBB SHADOW E&M-EST. PATIENT-LVL IV: CPT | Mod: PBBFAC,,, | Performed by: INTERNAL MEDICINE

## 2024-01-26 RX ORDER — ROSUVASTATIN CALCIUM 10 MG/1
10 TABLET, COATED ORAL DAILY
Qty: 90 TABLET | Refills: 1 | Status: SHIPPED | OUTPATIENT
Start: 2024-01-26 | End: 2024-04-25

## 2024-01-26 RX ORDER — PANTOPRAZOLE SODIUM 40 MG/1
40 TABLET, DELAYED RELEASE ORAL DAILY
Qty: 30 TABLET | Refills: 1 | Status: SHIPPED | OUTPATIENT
Start: 2024-01-26 | End: 2024-02-19

## 2024-01-26 RX ORDER — DULOXETIN HYDROCHLORIDE 60 MG/1
60 CAPSULE, DELAYED RELEASE ORAL DAILY
Qty: 90 CAPSULE | Refills: 1 | Status: SHIPPED | OUTPATIENT
Start: 2024-01-26 | End: 2024-04-25

## 2024-01-26 RX ORDER — MOMETASONE FUROATE 50 UG/1
2 SPRAY, METERED NASAL DAILY
Qty: 51 G | Refills: 3 | Status: SHIPPED | OUTPATIENT
Start: 2024-01-26 | End: 2024-01-30 | Stop reason: SDUPTHER

## 2024-01-26 NOTE — PROGRESS NOTES
Subjective:       Patient ID: Alivia Asencio is a 48 y.o. female.    Chief Complaint: Hypertension, Otalgia (Bilateral ear pain), and Skin Problem (Itchy right foot)    Here for follow up and med refill son cymbalta.  Anxiety significantly improved; feels like she is thinking clearer, not irritable and angry all the time.  She has been out of meds the last couple of days and  can tell the difference.   She stopped gabapentin because it seemed to bother her stomach.  Her stomach started to improve but then worsened again when she started back smoking.      Hypertension  The problem is controlled. Associated symptoms include neck pain and palpitations. Pertinent negatives include no chest pain, headaches or shortness of breath. Past treatments include ACE inhibitors. The current treatment provides significant improvement.   Otalgia   There is pain in both ears. The current episode started in the past 7 days. There has been no fever. Associated symptoms include abdominal pain (burning sensation), coughing, neck pain and rhinorrhea. Pertinent negatives include no diarrhea, ear discharge, headaches, hearing loss, rash, sore throat or vomiting. She has tried nothing for the symptoms.     Review of Systems   Constitutional:  Negative for activity change, appetite change, chills, diaphoresis, fatigue, fever and unexpected weight change.   HENT:  Positive for congestion, ear pain and rhinorrhea. Negative for ear discharge, hearing loss, mouth sores, nosebleeds, postnasal drip, sinus pressure, sinus pain, sneezing, sore throat, tinnitus, trouble swallowing and voice change.    Eyes:  Negative for photophobia, pain, discharge, redness, itching and visual disturbance.   Respiratory:  Positive for cough and wheezing. Negative for apnea, choking, chest tightness and shortness of breath.    Cardiovascular:  Positive for palpitations. Negative for chest pain and leg swelling.   Gastrointestinal:  Positive for abdominal pain  (burning sensation). Negative for abdominal distention, blood in stool, constipation, diarrhea, nausea and vomiting.   Endocrine: Negative for cold intolerance, heat intolerance, polydipsia and polyuria.   Genitourinary:  Positive for difficulty urinating. Negative for decreased urine volume, dyspareunia, dysuria, enuresis, frequency, genital sores, hematuria, menstrual problem, pelvic pain, urgency, vaginal bleeding, vaginal discharge and vaginal pain.   Musculoskeletal:  Positive for back pain and neck pain. Negative for arthralgias, gait problem, joint swelling, myalgias and neck stiffness.   Skin:  Negative for rash and wound.   Allergic/Immunologic: Negative for environmental allergies, food allergies and immunocompromised state.   Neurological:  Negative for dizziness, tremors, seizures, syncope, facial asymmetry, speech difficulty, weakness, light-headedness, numbness and headaches.   Hematological:  Positive for adenopathy. Does not bruise/bleed easily.   Psychiatric/Behavioral:  Positive for sleep disturbance. Negative for confusion, decreased concentration, hallucinations, self-injury and suicidal ideas. The patient is nervous/anxious.        Past Medical History:   Diagnosis Date    DDD (degenerative disc disease), cervical     DDD (degenerative disc disease), lumbar     Known health problems: none       Past Surgical History:   Procedure Laterality Date    CYST REMOVAL      ovaries     HYSTEROSCOPY WITH DILATION AND CURETTAGE OF UTERUS N/A 08/08/2018    Procedure: HYSTEROSCOPY, WITH DILATION AND CURETTAGE OF UTERUS;  Surgeon: Agustín Iraheta MD;  Location: [facility] OR;  Service: OB/GYN;  Laterality: N/A;    NECK SURGERY  09/11/2022    THERMAL ABLATION OF ENDOMETRIUM N/A 08/08/2018    Procedure: ABLATION, ENDOMETRIUM, THERMAL;  Surgeon: Agustín Iraheta MD;  Location: [facility] OR;  Service: OB/GYN;  Laterality: N/A;    TUBAL LIGATION         Family History   Problem Relation Age of Onset    Diabetes Paternal  Grandmother     Diabetes Maternal Grandmother     Prostate cancer Father     COPD Mother        Social History     Socioeconomic History    Marital status:     Number of children: 2   Occupational History    Occupation: disabled   Tobacco Use    Smoking status: Some Days     Current packs/day: 0.00     Average packs/day: 1 pack/day for 20.0 years (20.0 ttl pk-yrs)     Types: Cigars, Cigarettes     Last attempt to quit: 2023     Years since quittin.8    Smokeless tobacco: Never   Substance and Sexual Activity    Alcohol use: Not Currently    Drug use: Not Currently     Types: Marijuana    Sexual activity: Yes     Partners: Male     Birth control/protection: None, Surgical, See Surgical Hx   Social History Narrative    Live with        Current Outpatient Medications   Medication Sig Dispense Refill    lisinopriL (PRINIVIL,ZESTRIL) 20 MG tablet Take 1 tablet (20 mg total) by mouth once daily. 90 tablet 1    DULoxetine (CYMBALTA) 60 MG capsule Take 1 capsule (60 mg total) by mouth once daily. 90 capsule 1    HYDROcodone-acetaminophen (NORCO)  mg per tablet Take 1 tablet by mouth every 8 (eight) hours as needed.      mometasone (NASONEX) 50 mcg/actuation nasal spray 2 sprays by Nasal route once daily. 51 g 3    pantoprazole (PROTONIX) 40 MG tablet Take 1 tablet (40 mg total) by mouth once daily. 30 tablet 1    rosuvastatin (CRESTOR) 10 MG tablet Take 1 tablet (10 mg total) by mouth once daily. 90 tablet 1     No current facility-administered medications for this visit.       Review of patient's allergies indicates:   Allergen Reactions    Nsaids (non-steroidal anti-inflammatory drug) Other (See Comments)    Hydrocodone-acetaminophen Other (See Comments)    Pcn [penicillins]      Objective:    HPI     Otalgia     Additional comments: Bilateral ear pain           Skin Problem     Additional comments: Itchy right foot          Last edited by Marissa Nunez MA on 2024  9:15 AM.      Blood  "pressure 120/86, pulse (!) 113, temperature 96.7 °F (35.9 °C), temperature source Temporal, height 5' 2" (1.575 m), weight 108.2 kg (238 lb 6.8 oz), last menstrual period 01/24/2024, SpO2 99 %. Body mass index is 43.61 kg/m².   Physical Exam  Vitals and nursing note reviewed.   Constitutional:       General: She is not in acute distress.     Appearance: She is well-developed. She is obese. She is not ill-appearing, toxic-appearing or diaphoretic.   HENT:      Head: Normocephalic and atraumatic.      Right Ear: Tympanic membrane, ear canal and external ear normal.      Left Ear: Tympanic membrane, ear canal and external ear normal.   Eyes:      General: No scleral icterus.        Right eye: No discharge.         Left eye: No discharge.      Conjunctiva/sclera: Conjunctivae normal.   Neck:      Vascular: No carotid bruit.   Cardiovascular:      Rate and Rhythm: Regular rhythm. Tachycardia present.      Heart sounds: Normal heart sounds. No murmur heard.  Pulmonary:      Effort: Pulmonary effort is normal. No respiratory distress.      Breath sounds: Normal breath sounds. No decreased breath sounds, wheezing, rhonchi or rales.   Abdominal:      General: There is no distension.      Palpations: Abdomen is soft.      Tenderness: There is abdominal tenderness (mild) in the epigastric area. There is no guarding or rebound.   Musculoskeletal:      Right lower leg: No edema.      Left lower leg: No edema.   Skin:     General: Skin is warm and dry.   Neurological:      Mental Status: She is alert.      Motor: No tremor.   Psychiatric:         Mood and Affect: Mood normal.         Speech: Speech normal.         Behavior: Behavior normal.             Assessment:       1. Primary hypertension    2. Lumbar radiculopathy    3. Anxiety    4. Colon cancer screening    5. Otalgia of both ears    6. Mixed hyperlipidemia    7. Need for prophylactic vaccination and inoculation against influenza    8. Epigastric pain        Plan:     "   Alivia was seen today for hypertension, otalgia and skin problem.    Diagnoses and all orders for this visit:    Primary hypertension  Comments:  Controlled    Lumbar radiculopathy  -     DULoxetine (CYMBALTA) 60 MG capsule; Take 1 capsule (60 mg total) by mouth once daily.    Anxiety  Comments:  Good response to meds  Orders:  -     DULoxetine (CYMBALTA) 60 MG capsule; Take 1 capsule (60 mg total) by mouth once daily.    Colon cancer screening  -     Ambulatory referral/consult to Gastroenterology; Future    Otalgia of both ears  Comments:  Discussed OTC antihistamine, flonase    Mixed hyperlipidemia  -     rosuvastatin (CRESTOR) 10 MG tablet; Take 1 tablet (10 mg total) by mouth once daily.    Need for prophylactic vaccination and inoculation against influenza  -     Influenza - Quadrivalent (PF)    Epigastric pain  -     pantoprazole (PROTONIX) 40 MG tablet; Take 1 tablet (40 mg total) by mouth once daily.

## 2024-01-26 NOTE — TELEPHONE ENCOUNTER
----- Message from Clarita Landa sent at 1/26/2024 11:24 AM CST -----  Type: Needs Medical Advice  Who Called:  pt  Pharmacy name and phone #:    [id number] - [place] - [address]  [address] [address]  Phone: [phone] Fax: [phone]  Best Call Back Number: [phone]  Additional Information: pt is calling the office in regards to getting the Nasonex sent in as the pharmacy has not received it as of yet. Please call back to advise Thanks!

## 2024-01-30 ENCOUNTER — TELEPHONE (OUTPATIENT)
Dept: FAMILY MEDICINE | Facility: CLINIC | Age: 49
End: 2024-01-30
Payer: COMMERCIAL

## 2024-01-30 RX ORDER — MOMETASONE FUROATE 50 UG/1
2 SPRAY, METERED NASAL DAILY
Qty: 51 G | Refills: 3 | Status: SHIPPED | OUTPATIENT
Start: 2024-01-30

## 2024-01-30 NOTE — TELEPHONE ENCOUNTER
----- Message from Marissa Nunez MA sent at 1/30/2024  8:36 AM CST -----  Please resend to [facility] [address]

## 2024-02-18 DIAGNOSIS — K21.9 GASTROESOPHAGEAL REFLUX DISEASE, UNSPECIFIED WHETHER ESOPHAGITIS PRESENT: Primary | ICD-10-CM

## 2024-02-18 DIAGNOSIS — R10.13 EPIGASTRIC PAIN: ICD-10-CM

## 2024-02-19 RX ORDER — PANTOPRAZOLE SODIUM 40 MG/1
40 TABLET, DELAYED RELEASE ORAL
Qty: 90 TABLET | Refills: 1 | Status: SHIPPED | OUTPATIENT
Start: 2024-02-19 | End: 2024-06-14

## 2024-02-20 ENCOUNTER — TELEPHONE (OUTPATIENT)
Dept: FAMILY MEDICINE | Facility: CLINIC | Age: 49
End: 2024-02-20
Payer: COMMERCIAL

## 2024-02-20 DIAGNOSIS — I10 PRIMARY HYPERTENSION: ICD-10-CM

## 2024-02-20 RX ORDER — LISINOPRIL 20 MG/1
20 TABLET ORAL DAILY
Qty: 90 TABLET | Refills: 1 | Status: SHIPPED | OUTPATIENT
Start: 2024-02-20 | End: 2025-02-19

## 2024-02-20 NOTE — TELEPHONE ENCOUNTER
----- Message from Marissa Nunez MA sent at 2/20/2024  1:07 PM CST -----  Please reroute to [facility] [address].

## 2024-04-25 DIAGNOSIS — E78.2 MIXED HYPERLIPIDEMIA: ICD-10-CM

## 2024-04-25 DIAGNOSIS — M54.16 LUMBAR RADICULOPATHY: ICD-10-CM

## 2024-04-25 DIAGNOSIS — F41.9 ANXIETY: ICD-10-CM

## 2024-04-25 RX ORDER — ROSUVASTATIN CALCIUM 10 MG/1
10 TABLET, COATED ORAL
Qty: 90 TABLET | Refills: 1 | Status: SHIPPED | OUTPATIENT
Start: 2024-04-25

## 2024-04-25 RX ORDER — DULOXETIN HYDROCHLORIDE 60 MG/1
60 CAPSULE, DELAYED RELEASE ORAL
Qty: 90 CAPSULE | Refills: 1 | Status: SHIPPED | OUTPATIENT
Start: 2024-04-25

## 2024-05-03 DIAGNOSIS — M54.16 LUMBAR RADICULOPATHY: ICD-10-CM

## 2024-05-03 RX ORDER — GABAPENTIN 300 MG/1
300 CAPSULE ORAL 3 TIMES DAILY
Qty: 270 CAPSULE | Refills: 1 | Status: SHIPPED | OUTPATIENT
Start: 2024-05-03

## 2024-05-17 ENCOUNTER — HOSPITAL ENCOUNTER (OUTPATIENT)
Dept: RADIOLOGY | Facility: HOSPITAL | Age: 49
Discharge: HOME | End: 2024-05-17
Attending: INTERNAL MEDICINE
Payer: COMMERCIAL

## 2024-05-17 DIAGNOSIS — N60.01 BREAST CYST, RIGHT: ICD-10-CM

## 2024-05-17 DIAGNOSIS — R92.8 ABNORMAL MAMMOGRAM OF RIGHT BREAST: ICD-10-CM

## 2024-05-17 PROCEDURE — 76642 ULTRASOUND BREAST LIMITED: CPT | Mod: TC,PO,RT

## 2024-05-17 PROCEDURE — 76642 ULTRASOUND BREAST LIMITED: CPT | Mod: 26,RT,, | Performed by: RADIOLOGY

## 2024-06-14 DIAGNOSIS — K21.9 GASTROESOPHAGEAL REFLUX DISEASE, UNSPECIFIED WHETHER ESOPHAGITIS PRESENT: ICD-10-CM

## 2024-06-14 DIAGNOSIS — R10.13 EPIGASTRIC PAIN: ICD-10-CM

## 2024-06-14 RX ORDER — PANTOPRAZOLE SODIUM 40 MG/1
40 TABLET, DELAYED RELEASE ORAL
Qty: 90 TABLET | Refills: 1 | Status: SHIPPED | OUTPATIENT
Start: 2024-06-14

## 2024-06-26 ENCOUNTER — OFFICE VISIT (OUTPATIENT)
Dept: FAMILY MEDICINE | Facility: CLINIC | Age: 49
End: 2024-06-26
Payer: COMMERCIAL

## 2024-06-26 VITALS
TEMPERATURE: 97 F | HEART RATE: 83 BPM | OXYGEN SATURATION: 99 % | SYSTOLIC BLOOD PRESSURE: 140 MMHG | HEIGHT: 62 IN | WEIGHT: 246.5 LBS | DIASTOLIC BLOOD PRESSURE: 96 MMHG | BODY MASS INDEX: 45.36 KG/M2

## 2024-06-26 DIAGNOSIS — R41.840 ATTENTION AND CONCENTRATION DEFICIT: ICD-10-CM

## 2024-06-26 DIAGNOSIS — I10 PRIMARY HYPERTENSION: Primary | ICD-10-CM

## 2024-06-26 DIAGNOSIS — E78.2 MIXED HYPERLIPIDEMIA: ICD-10-CM

## 2024-06-26 DIAGNOSIS — F17.210 TOBACCO DEPENDENCE DUE TO CIGARETTES: ICD-10-CM

## 2024-06-26 PROCEDURE — 3080F DIAST BP >= 90 MM HG: CPT | Mod: CPTII,S$GLB,, | Performed by: INTERNAL MEDICINE

## 2024-06-26 PROCEDURE — 3008F BODY MASS INDEX DOCD: CPT | Mod: CPTII,S$GLB,, | Performed by: INTERNAL MEDICINE

## 2024-06-26 PROCEDURE — 3077F SYST BP >= 140 MM HG: CPT | Mod: CPTII,S$GLB,, | Performed by: INTERNAL MEDICINE

## 2024-06-26 PROCEDURE — 99214 OFFICE O/P EST MOD 30 MIN: CPT | Mod: S$GLB,,, | Performed by: INTERNAL MEDICINE

## 2024-06-26 PROCEDURE — 4010F ACE/ARB THERAPY RXD/TAKEN: CPT | Mod: CPTII,S$GLB,, | Performed by: INTERNAL MEDICINE

## 2024-06-26 PROCEDURE — 99999 PR PBB SHADOW E&M-EST. PATIENT-LVL III: CPT | Mod: PBBFAC,,, | Performed by: INTERNAL MEDICINE

## 2024-06-26 PROCEDURE — 1159F MED LIST DOCD IN RCRD: CPT | Mod: CPTII,S$GLB,, | Performed by: INTERNAL MEDICINE

## 2024-06-26 PROCEDURE — 1160F RVW MEDS BY RX/DR IN RCRD: CPT | Mod: CPTII,S$GLB,, | Performed by: INTERNAL MEDICINE

## 2024-06-26 RX ORDER — DEXTROAMPHETAMINE SACCHARATE, AMPHETAMINE ASPARTATE MONOHYDRATE, DEXTROAMPHETAMINE SULFATE AND AMPHETAMINE SULFATE 5; 5; 5; 5 MG/1; MG/1; MG/1; MG/1
20 CAPSULE, EXTENDED RELEASE ORAL EVERY MORNING
Qty: 30 CAPSULE | Refills: 0 | Status: SHIPPED | OUTPATIENT
Start: 2024-06-26 | End: 2024-07-26

## 2024-06-26 RX ORDER — LISINOPRIL 20 MG/1
20 TABLET ORAL 2 TIMES DAILY
Qty: 180 TABLET | Refills: 1 | Status: SHIPPED | OUTPATIENT
Start: 2024-06-26 | End: 2025-06-26

## 2024-06-26 NOTE — PROGRESS NOTES
Subjective:       Patient ID: Alivia Asencio is a 48 y.o. female.    Chief Complaint: Headache (Onset last three days. Relates it to sinus pressure. ) and Hypertension    Here for routine follow up; last visit note, most recent available labs, and health maintenance topics reviewed.   She reports that this is the best she has felt mentally and physically in 20 years.  However, she finds that she still doesn't have the motivation to do anything or get back out into the world.  She never seems to be able to finish a project at home; starts something and then puts it down and does something else.  She reports she underwent Psych eval and was on adderall in the past but she hasn't taken it in decades because she wasn't working or otherwise doing anything.  She is wondering if that would help her.  Doesn't feel like she is depressed; cymbalta has really been beneficial for her.     Hypertension  Associated symptoms include headaches and neck pain. Pertinent negatives include no chest pain, palpitations or shortness of breath. Past treatments include ACE inhibitors. The current treatment provides significant improvement.   Otalgia   There is pain in both ears. The current episode started in the past 7 days. There has been no fever. Associated symptoms include diarrhea, headaches and neck pain. Pertinent negatives include no abdominal pain, coughing, ear discharge, hearing loss, rash, rhinorrhea, sore throat or vomiting. She has tried nothing for the symptoms.     Review of Systems   Constitutional:  Negative for activity change, appetite change, chills, diaphoresis, fatigue, fever and unexpected weight change.   HENT:  Negative for congestion, ear discharge, ear pain, hearing loss, mouth sores, nosebleeds, postnasal drip, rhinorrhea, sinus pressure, sinus pain, sneezing, sore throat, tinnitus, trouble swallowing and voice change.    Eyes:  Negative for photophobia, pain, discharge, redness, itching and visual  disturbance.   Respiratory:  Positive for wheezing. Negative for apnea, cough, choking, chest tightness and shortness of breath.    Cardiovascular:  Negative for chest pain, palpitations and leg swelling.   Gastrointestinal:  Positive for diarrhea. Negative for abdominal distention, abdominal pain, blood in stool, constipation, nausea and vomiting.   Endocrine: Negative for cold intolerance, heat intolerance, polydipsia and polyuria.   Genitourinary:  Negative for decreased urine volume, difficulty urinating, dyspareunia, dysuria, enuresis, frequency, genital sores, hematuria, menstrual problem, pelvic pain, urgency, vaginal bleeding, vaginal discharge and vaginal pain.   Musculoskeletal:  Positive for arthralgias, back pain, joint swelling (ankle and feet bilateral) and neck pain. Negative for gait problem, myalgias and neck stiffness.   Skin:  Negative for rash and wound.   Allergic/Immunologic: Negative for environmental allergies, food allergies and immunocompromised state.   Neurological:  Positive for numbness and headaches. Negative for dizziness, tremors, seizures, syncope, facial asymmetry, speech difficulty, weakness and light-headedness (left leg).   Hematological:  Negative for adenopathy. Does not bruise/bleed easily.   Psychiatric/Behavioral:  Negative for confusion, decreased concentration, hallucinations, self-injury, sleep disturbance and suicidal ideas. The patient is not nervous/anxious.        Past Medical History:   Diagnosis Date    DDD (degenerative disc disease), cervical     DDD (degenerative disc disease), lumbar     Known health problems: none       Past Surgical History:   Procedure Laterality Date    CYST REMOVAL      ovaries     HYSTEROSCOPY WITH DILATION AND CURETTAGE OF UTERUS N/A 08/08/2018    Procedure: HYSTEROSCOPY, WITH DILATION AND CURETTAGE OF UTERUS;  Surgeon: Agustín Iraheta MD;  Location: [facility];  Service: OB/GYN;  Laterality: N/A;    NECK SURGERY  09/11/2022    THERMAL  ABLATION OF ENDOMETRIUM N/A 2018    Procedure: ABLATION, ENDOMETRIUM, THERMAL;  Surgeon: Agustín Iraheta MD;  Location: [facility] OR;  Service: OB/GYN;  Laterality: N/A;    TUBAL LIGATION         Family History   Problem Relation Name Age of Onset    Diabetes Paternal Grandmother      Diabetes Maternal Grandmother      Prostate cancer Father      COPD Mother         Social History     Socioeconomic History    Marital status:     Number of children: 2   Occupational History    Occupation: disabled   Tobacco Use    Smoking status: Some Days     Current packs/day: 0.00     Average packs/day: 1 pack/day for 20.0 years (20.0 ttl pk-yrs)     Types: Cigars, Cigarettes     Last attempt to quit: 2023     Years since quittin.2    Smokeless tobacco: Never   Substance and Sexual Activity    Alcohol use: Not Currently    Drug use: Not Currently     Types: Marijuana    Sexual activity: Yes     Partners: Male     Birth control/protection: None, Surgical, See Surgical Hx   Social History Narrative    Live with        Current Outpatient Medications   Medication Sig Dispense Refill    DULoxetine (CYMBALTA) 60 MG capsule TAKE 1 CAPSULE BY MOUTH EVERY DAY 90 capsule 1    mometasone (NASONEX) 50 mcg/actuation nasal spray 2 sprays by Nasal route once daily. 51 g 3    pantoprazole (PROTONIX) 40 MG tablet TAKE 1 TABLET BY MOUTH EVERY DAY 90 tablet 1    rosuvastatin (CRESTOR) 10 MG tablet TAKE 1 TABLET BY MOUTH EVERY DAY 90 tablet 1    dextroamphetamine-amphetamine (ADDERALL XR) 20 MG 24 hr capsule Take 1 capsule (20 mg total) by mouth every morning. 30 capsule 0    lisinopriL (PRINIVIL,ZESTRIL) 20 MG tablet Take 1 tablet (20 mg total) by mouth 2 (two) times a day. 180 tablet 1     No current facility-administered medications for this visit.       Review of patient's allergies indicates:   Allergen Reactions    Nsaids (non-steroidal anti-inflammatory drug) Other (See Comments)    Hydrocodone-acetaminophen Other (See  "Comments)    Pcn [penicillins]      Objective:    HPI     Headache     Additional comments: Onset last three days. Relates it to sinus pressure.             Last edited by Joy Post LPN on 6/26/2024  9:17 AM.      Blood pressure (!) 140/96, pulse 83, temperature 96.5 °F (35.8 °C), temperature source Temporal, height 5' 2" (1.575 m), weight 111.8 kg (246 lb 7.6 oz), SpO2 99%. Body mass index is 45.08 kg/m².   Physical Exam  Vitals and nursing note reviewed.   Constitutional:       General: She is not in acute distress.     Appearance: She is well-developed. She is obese. She is not ill-appearing, toxic-appearing or diaphoretic.   HENT:      Head: Normocephalic and atraumatic.   Eyes:      General: No scleral icterus.        Right eye: No discharge.         Left eye: No discharge.      Conjunctiva/sclera: Conjunctivae normal.   Neck:      Vascular: No carotid bruit.   Cardiovascular:      Rate and Rhythm: Normal rate and regular rhythm.      Heart sounds: Normal heart sounds. No murmur heard.  Pulmonary:      Effort: Pulmonary effort is normal. No respiratory distress.      Breath sounds: Rhonchi present. No decreased breath sounds, wheezing or rales.   Abdominal:      General: There is no distension.      Palpations: Abdomen is soft.      Tenderness: There is no abdominal tenderness. There is no guarding or rebound.   Musculoskeletal:      Right lower leg: No edema.      Left lower leg: No edema.   Skin:     General: Skin is warm and dry.   Neurological:      Mental Status: She is alert.      Motor: No tremor.   Psychiatric:         Mood and Affect: Mood normal.         Speech: Speech normal.         Behavior: Behavior normal.             Assessment:       1. Primary hypertension    2. Mixed hyperlipidemia    3. Attention and concentration deficit    4. Tobacco dependence due to cigarettes        Plan:       Alivia Petty" was seen today for headache and hypertension.    Diagnoses and all orders for this " visit:    Primary hypertension  Comments:  Will increase lisinopril to BID  Orders:  -     lisinopriL (PRINIVIL,ZESTRIL) 20 MG tablet; Take 1 tablet (20 mg total) by mouth 2 (two) times a day.    Mixed hyperlipidemia  -     Comprehensive Metabolic Panel; Future  -     Lipid Panel; Future    Attention and concentration deficit  Comments:  She remembers being on Adderall in the past.  Will give her a trial but need to be cognizant of it's effects on blood pressure.  Orders:  -     dextroamphetamine-amphetamine (ADDERALL XR) 20 MG 24 hr capsule; Take 1 capsule (20 mg total) by mouth every morning.    Tobacco dependence due to cigarettes  Comments:  Not really a good candidate for depression given her long h/o depression issues.  Discussed nicotine replacement options.

## 2024-07-12 ENCOUNTER — LAB VISIT (OUTPATIENT)
Dept: LAB | Facility: HOSPITAL | Age: 49
End: 2024-07-12
Attending: INTERNAL MEDICINE
Payer: COMMERCIAL

## 2024-07-12 DIAGNOSIS — E78.2 MIXED HYPERLIPIDEMIA: ICD-10-CM

## 2024-07-12 LAB
ALBUMIN SERPL BCP-MCNC: 4.2 G/DL (ref 3.5–5.2)
ALP SERPL-CCNC: 68 U/L (ref 55–135)
ALT SERPL W/O P-5'-P-CCNC: 24 U/L (ref 10–44)
ANION GAP SERPL CALC-SCNC: 5 MMOL/L (ref 8–16)
AST SERPL-CCNC: 19 U/L (ref 10–40)
BILIRUB SERPL-MCNC: 0.3 MG/DL (ref 0.1–1)
BUN SERPL-MCNC: 15 MG/DL (ref 6–20)
CALCIUM SERPL-MCNC: 9.1 MG/DL (ref 8.7–10.5)
CHLORIDE SERPL-SCNC: 105 MMOL/L (ref 95–110)
CHOLEST SERPL-MCNC: 157 MG/DL (ref 120–199)
CHOLEST/HDLC SERPL: 3.4 {RATIO} (ref 2–5)
CO2 SERPL-SCNC: 29 MMOL/L (ref 23–29)
CREAT SERPL-MCNC: 0.7 MG/DL (ref 0.5–1.4)
EST. GFR  (NO RACE VARIABLE): >60 ML/MIN/1.73 M^2
GLUCOSE SERPL-MCNC: 84 MG/DL (ref 70–110)
HDLC SERPL-MCNC: 46 MG/DL (ref 40–75)
HDLC SERPL: 29.3 % (ref 20–50)
LDLC SERPL CALC-MCNC: 87.8 MG/DL (ref 63–159)
NONHDLC SERPL-MCNC: 111 MG/DL
POTASSIUM SERPL-SCNC: 4.4 MMOL/L (ref 3.5–5.1)
PROT SERPL-MCNC: 7 G/DL (ref 6–8.4)
SODIUM SERPL-SCNC: 139 MMOL/L (ref 136–145)
TRIGL SERPL-MCNC: 116 MG/DL (ref 30–150)

## 2024-07-12 PROCEDURE — 80053 COMPREHEN METABOLIC PANEL: CPT | Performed by: INTERNAL MEDICINE

## 2024-07-12 PROCEDURE — 80061 LIPID PANEL: CPT | Performed by: INTERNAL MEDICINE

## 2024-07-12 PROCEDURE — 36415 COLL VENOUS BLD VENIPUNCTURE: CPT | Performed by: INTERNAL MEDICINE

## 2024-09-25 DIAGNOSIS — N64.89 OTHER SPECIFIED DISORDERS OF BREAST: ICD-10-CM

## 2024-09-25 DIAGNOSIS — N64.52 NIPPLE DISCHARGE: Primary | ICD-10-CM

## 2024-10-15 ENCOUNTER — HOSPITAL ENCOUNTER (OUTPATIENT)
Dept: RADIOLOGY | Facility: HOSPITAL | Age: 49
Discharge: HOME | End: 2024-10-15
Attending: STUDENT IN AN ORGANIZED HEALTH CARE EDUCATION/TRAINING PROGRAM
Payer: COMMERCIAL

## 2024-10-15 VITALS — BODY MASS INDEX: 45.27 KG/M2 | WEIGHT: 246 LBS | HEIGHT: 62 IN

## 2024-10-15 DIAGNOSIS — N64.89 OTHER SPECIFIED DISORDERS OF BREAST: ICD-10-CM

## 2024-10-15 DIAGNOSIS — N64.52 NIPPLE DISCHARGE: ICD-10-CM

## 2024-10-15 PROCEDURE — 77066 DX MAMMO INCL CAD BI: CPT | Mod: TC,PO

## 2024-10-15 PROCEDURE — 77062 BREAST TOMOSYNTHESIS BI: CPT | Mod: TC,PO

## 2024-10-15 PROCEDURE — 77066 DX MAMMO INCL CAD BI: CPT | Mod: 26,,, | Performed by: RADIOLOGY

## 2024-10-15 PROCEDURE — 77062 BREAST TOMOSYNTHESIS BI: CPT | Mod: 26,,, | Performed by: RADIOLOGY

## 2024-10-15 PROCEDURE — 76642 ULTRASOUND BREAST LIMITED: CPT | Mod: 26,50,, | Performed by: RADIOLOGY

## 2024-10-15 PROCEDURE — 76642 ULTRASOUND BREAST LIMITED: CPT | Mod: TC,50,PO

## 2025-06-23 ENCOUNTER — CLINICAL SUPPORT (OUTPATIENT)
Dept: URGENT CARE | Facility: CLINIC | Age: 50
End: 2025-06-23

## 2025-06-23 DIAGNOSIS — Z02.83 DRUG SCREENING IN ATHLETES: Primary | ICD-10-CM

## 2025-06-23 PROCEDURE — 80305 DRUG TEST PRSMV DIR OPT OBS: CPT | Mod: S$GLB,,, | Performed by: EMERGENCY MEDICINE

## (undated) DEVICE — PAD SANITARY OB STERILE

## (undated) DEVICE — PACK DRAPE PERI/GYN TIBURON

## (undated) DEVICE — SOL NACL IRR 3000ML

## (undated) DEVICE — TRAY DRY SKIN SCRUB PREP

## (undated) DEVICE — GAUZE SPONGE XRAY 4X4

## (undated) DEVICE — CATH 16FR URETHRL RED RUB

## (undated) DEVICE — KIT DEV NOVASURE ENDOMETRIAL.

## (undated) DEVICE — SCRUB HIBICLENS 4% CHG 4OZ

## (undated) DEVICE — DRESSING TELFA PAD N ADH 2X3

## (undated) DEVICE — SPONGE NOVAPLUS LAP 18X18IN

## (undated) DEVICE — TUBING INFLOW HYSTEROSCOPY

## (undated) DEVICE — TUBING OUTFLOW/HYSTEROSCOPY

## (undated) DEVICE — GLOVE SURG ULTRA TOUCH 7

## (undated) DEVICE — SOL NACL IRR 1000ML BTL

## (undated) DEVICE — SEE MEDLINE ITEM 157116

## (undated) DEVICE — CANISTER SUCTION 3000CC

## (undated) DEVICE — DRAPE UNDER BUTTOCKS WITH POUCH

## (undated) DEVICE — COVER SURG LIGHT HANDLE